# Patient Record
Sex: FEMALE | Race: BLACK OR AFRICAN AMERICAN | URBAN - METROPOLITAN AREA
[De-identification: names, ages, dates, MRNs, and addresses within clinical notes are randomized per-mention and may not be internally consistent; named-entity substitution may affect disease eponyms.]

---

## 2021-06-02 ENCOUNTER — LAB OUTSIDE AN ENCOUNTER (OUTPATIENT)
Dept: URBAN - METROPOLITAN AREA CLINIC 105 | Facility: CLINIC | Age: 66
End: 2021-06-02

## 2021-06-02 ENCOUNTER — OFFICE VISIT (OUTPATIENT)
Dept: URBAN - METROPOLITAN AREA CLINIC 105 | Facility: CLINIC | Age: 66
End: 2021-06-02
Payer: MEDICARE

## 2021-06-02 DIAGNOSIS — R19.7 DIARRHEA, UNSPECIFIED TYPE: ICD-10-CM

## 2021-06-02 DIAGNOSIS — D12.6 COLON ADENOMA: ICD-10-CM

## 2021-06-02 DIAGNOSIS — K57.90 DIVERTICULOSIS: ICD-10-CM

## 2021-06-02 DIAGNOSIS — F11.90 CHRONIC NARCOTIC USE: ICD-10-CM

## 2021-06-02 DIAGNOSIS — K21.00 GASTROESOPHAGEAL REFLUX DISEASE WITH ESOPHAGITIS WITHOUT HEMORRHAGE: ICD-10-CM

## 2021-06-02 DIAGNOSIS — K59.09 CHRONIC CONSTIPATION WITH OVERFLOW: ICD-10-CM

## 2021-06-02 DIAGNOSIS — I10 ESSENTIAL HYPERTENSION: ICD-10-CM

## 2021-06-02 DIAGNOSIS — E13.9 DIABETES 1.5, MANAGED AS TYPE 2: ICD-10-CM

## 2021-06-02 PROCEDURE — 99213 OFFICE O/P EST LOW 20 MIN: CPT | Performed by: INTERNAL MEDICINE

## 2021-06-02 RX ORDER — INSULIN DETEMIR 100 [IU]/ML
INJECT BY SUBCUTANEOUS ROUTE PER PRESCRIBER'S INSTRUCTIONS. INSULIN DOSING REQUIRES INDIVIDUALIZATION INJECTION, SOLUTION SUBCUTANEOUS
Qty: 1 | Refills: 0 | Status: ACTIVE | COMMUNITY
Start: 1900-01-01

## 2021-06-02 RX ORDER — GLIPIZIDE 10 MG/1
1 TABLET 30 MINUTES BEFORE BREAKFAST TABLET ORAL ONCE A DAY
Status: ACTIVE | COMMUNITY

## 2021-06-02 RX ORDER — PRAVASTATIN SODIUM 40 MG/1
TAKE 1 TABLET (40 MG) BY ORAL ROUTE ONCE DAILY AT BEDTIME TABLET ORAL 1
Qty: 0 | Refills: 0 | Status: DISCONTINUED | COMMUNITY
Start: 1900-01-01

## 2021-06-02 RX ORDER — ATORVASTATIN CALCIUM 80 MG/1
1 TABLET TABLET, FILM COATED ORAL ONCE A DAY
Status: ACTIVE | COMMUNITY

## 2021-06-02 RX ORDER — LOSARTAN POTASSIUM 100 MG/1
TAKE 1 TABLET (100 MG) BY ORAL ROUTE ONCE DAILY TABLET, FILM COATED ORAL 1
Qty: 0 | Refills: 0 | Status: ACTIVE | COMMUNITY
Start: 1900-01-01

## 2021-06-02 RX ORDER — CHLORHEXIDINE GLUCONATE 4 %
TAKE 2 TABLETS BY ORAL ROUTE DAILY LIQUID (ML) TOPICAL 1
Qty: 0 | Refills: 0 | Status: DISCONTINUED | COMMUNITY
Start: 1900-01-01

## 2021-06-02 RX ORDER — FAMOTIDINE 40 MG/1
1 TABLET AT BEDTIME TABLET, FILM COATED ORAL ONCE A DAY
Status: ACTIVE | COMMUNITY

## 2021-06-02 RX ORDER — CYCLOBENZAPRINE HYDROCHLORIDE 10 MG/1
1 TABLET AT BEDTIME AS NEEDED TABLET, FILM COATED ORAL ONCE A DAY
Status: ACTIVE | COMMUNITY

## 2021-06-02 RX ORDER — BIFIDOBACTERIUM LONGUM 10MM CELL
TAKE 1 CAPSULE BY ORAL ROUTE DAILY CAPSULE ORAL 1
Qty: 0 | Refills: 0 | Status: ACTIVE | COMMUNITY
Start: 1900-01-01

## 2021-06-02 RX ORDER — HYDROCODONE BITARTRATE AND ACETAMINOPHEN 10; 325 MG/1; MG/1
TAKE 1 TABLET BY ORAL ROUTE EVERY 6 HOURS AS NEEDED FOR PAIN TABLET ORAL
Qty: 0 | Refills: 0 | Status: ACTIVE | COMMUNITY
Start: 1900-01-01

## 2021-06-02 RX ORDER — OMEPRAZOLE 40 MG/1
1 CAPSULE 30 MINUTES BEFORE MORNING MEAL CAPSULE, DELAYED RELEASE ORAL ONCE A DAY
Qty: 90 | Refills: 3 | OUTPATIENT
Start: 2021-06-02

## 2021-06-02 RX ORDER — POTASSIUM CHLORIDE 1.5 G/1.58G
1 PACKET WITH FOOD POWDER, FOR SOLUTION ORAL ONCE A DAY
Status: ACTIVE | COMMUNITY

## 2021-06-02 RX ORDER — DULOXETINE 30 MG/1
CAPSULE, DELAYED RELEASE PELLETS ORAL
Qty: 0 | Refills: 0 | Status: DISCONTINUED | COMMUNITY
Start: 1900-01-01

## 2021-06-02 NOTE — HPI-TODAY'S VISIT:
65 yo lady here for positive FOBT. Pt of Dr Gilmore. "My stomach stays upset so much". She c/o a pinching epigastric sensation which she has had in the past and has recurred in the last week. Pain is intermittent, worse with food, about a few hours after. It did not happen today and she had eaten earlier. Some nausea, no vomiting. She has BMs mostly in the am, sometimes during the day. Stools have become loose again - she usually responds to a course of xifaxan. She has recently taken abx for Lyme disease 2 weeks ago. Does not recall the name. she does not use nsaids and has not taken ASA.  10/4/19 Discussed eGD and bx results. SHe is sleeping with HOB elevated but not adhering to 3 hours between eating and laying down. Stools are the same "sometimes diarrhea. sometimes constipatioN'. SHe is constipated due to iron she says. She has 1-2 BMs a day. by "constipation" she means hard stool, and by "diarrhea" she means loose stool. discussed elevated fecal calprotectin. No abdominal pain, "just growling and gas". 2/5/20 Ran out of PPI and is having a lot of heartburn and sour taste. Ran out in December. BMs are unchanged.' Eats dinner at 8 pm, lays down at 9 pm.  3/27/20 This was a telephone conversation. Pt was at home. Lives at home with daughter and grandson "I have been having real bad diarrhea". Says diarrhea started about 6 weeks ago. Has 4-5 BMs a day. NO blood in stool. No fevers or chills. No sick contacts. No vomiting. no abdominal pain. Feels very gassy.  6/2/21 c/o alternating constipation and loose stools. Off PPI as her script ran out and so heartburn recurred. Stopped metamucil as well.

## 2021-07-07 ENCOUNTER — OFFICE VISIT (OUTPATIENT)
Dept: URBAN - METROPOLITAN AREA CLINIC 105 | Facility: CLINIC | Age: 66
End: 2021-07-07
Payer: MEDICARE

## 2021-07-07 DIAGNOSIS — K59.09 CHRONIC CONSTIPATION WITH OVERFLOW: ICD-10-CM

## 2021-07-07 DIAGNOSIS — K21.00 GASTROESOPHAGEAL REFLUX DISEASE WITH ESOPHAGITIS WITHOUT HEMORRHAGE: ICD-10-CM

## 2021-07-07 DIAGNOSIS — R19.7 DIARRHEA, UNSPECIFIED TYPE: ICD-10-CM

## 2021-07-07 DIAGNOSIS — R11.0 NAUSEA: ICD-10-CM

## 2021-07-07 DIAGNOSIS — D12.6 COLON ADENOMA: ICD-10-CM

## 2021-07-07 DIAGNOSIS — N20.0 RENAL STONE: ICD-10-CM

## 2021-07-07 DIAGNOSIS — E13.9 DIABETES 1.5, MANAGED AS TYPE 2: ICD-10-CM

## 2021-07-07 DIAGNOSIS — K57.90 DIVERTICULOSIS: ICD-10-CM

## 2021-07-07 DIAGNOSIS — I10 ESSENTIAL HYPERTENSION: ICD-10-CM

## 2021-07-07 DIAGNOSIS — F11.90 CHRONIC NARCOTIC USE: ICD-10-CM

## 2021-07-07 PROCEDURE — 99214 OFFICE O/P EST MOD 30 MIN: CPT | Performed by: INTERNAL MEDICINE

## 2021-07-07 RX ORDER — ONDANSETRON HYDROCHLORIDE 8 MG/1
1 CAPSULE TABLET, FILM COATED ORAL
Qty: 90 | Refills: 0 | OUTPATIENT
Start: 2021-07-07 | End: 2021-08-06

## 2021-07-07 RX ORDER — OMEPRAZOLE 40 MG/1
1 CAPSULE 30 MINUTES BEFORE MORNING MEAL CAPSULE, DELAYED RELEASE ORAL TWICE A DAY
Qty: 180 | Refills: 3 | OUTPATIENT

## 2021-07-07 RX ORDER — FAMOTIDINE 40 MG/1
1 TABLET AT BEDTIME TABLET, FILM COATED ORAL ONCE A DAY
Status: ACTIVE | COMMUNITY

## 2021-07-07 RX ORDER — ATORVASTATIN CALCIUM 80 MG/1
1 TABLET TABLET, FILM COATED ORAL ONCE A DAY
Status: ACTIVE | COMMUNITY

## 2021-07-07 RX ORDER — GLIPIZIDE 10 MG/1
1 TABLET 30 MINUTES BEFORE BREAKFAST TABLET ORAL ONCE A DAY
Status: ACTIVE | COMMUNITY

## 2021-07-07 RX ORDER — LOSARTAN POTASSIUM 100 MG/1
TAKE 1 TABLET (100 MG) BY ORAL ROUTE ONCE DAILY TABLET, FILM COATED ORAL 1
Qty: 0 | Refills: 0 | Status: ACTIVE | COMMUNITY
Start: 1900-01-01

## 2021-07-07 RX ORDER — INSULIN DETEMIR 100 [IU]/ML
INJECT BY SUBCUTANEOUS ROUTE PER PRESCRIBER'S INSTRUCTIONS. INSULIN DOSING REQUIRES INDIVIDUALIZATION INJECTION, SOLUTION SUBCUTANEOUS
Qty: 1 | Refills: 0 | Status: ACTIVE | COMMUNITY
Start: 1900-01-01

## 2021-07-07 RX ORDER — OMEPRAZOLE 40 MG/1
1 CAPSULE 30 MINUTES BEFORE MORNING MEAL CAPSULE, DELAYED RELEASE ORAL ONCE A DAY
Qty: 90 | Refills: 3 | Status: ACTIVE | COMMUNITY
Start: 2021-06-02

## 2021-07-07 RX ORDER — POTASSIUM CHLORIDE 1.5 G/1.58G
1 PACKET WITH FOOD POWDER, FOR SOLUTION ORAL ONCE A DAY
Status: ACTIVE | COMMUNITY

## 2021-07-07 RX ORDER — CYCLOBENZAPRINE HYDROCHLORIDE 10 MG/1
1 TABLET AT BEDTIME AS NEEDED TABLET, FILM COATED ORAL ONCE A DAY
Status: ACTIVE | COMMUNITY

## 2021-07-07 RX ORDER — HYDROCODONE BITARTRATE AND ACETAMINOPHEN 10; 325 MG/1; MG/1
TAKE 1 TABLET BY ORAL ROUTE EVERY 6 HOURS AS NEEDED FOR PAIN TABLET ORAL
Qty: 0 | Refills: 0 | Status: ACTIVE | COMMUNITY
Start: 1900-01-01

## 2021-07-07 RX ORDER — BIFIDOBACTERIUM LONGUM 10MM CELL
TAKE 1 CAPSULE BY ORAL ROUTE DAILY CAPSULE ORAL 1
Qty: 0 | Refills: 0 | Status: ACTIVE | COMMUNITY
Start: 1900-01-01

## 2021-07-07 NOTE — HPI-TODAY'S VISIT:
63 yo lady here for positive FOBT. Pt of Dr Gilmore. "My stomach stays upset so much". She c/o a pinching epigastric sensation which she has had in the past and has recurred in the last week. Pain is intermittent, worse with food, about a few hours after. It did not happen today and she had eaten earlier. Some nausea, no vomiting. She has BMs mostly in the am, sometimes during the day. Stools have become loose again - she usually responds to a course of xifaxan. She has recently taken abx for Lyme disease 2 weeks ago. Does not recall the name. she does not use nsaids and has not taken ASA.  10/4/19 Discussed eGD and bx results. SHe is sleeping with HOB elevated but not adhering to 3 hours between eating and laying down. Stools are the same "sometimes diarrhea. sometimes constipatioN'. SHe is constipated due to iron she says. She has 1-2 BMs a day. by "constipation" she means hard stool, and by "diarrhea" she means loose stool. discussed elevated fecal calprotectin. No abdominal pain, "just growling and gas". 2/5/20 Ran out of PPI and is having a lot of heartburn and sour taste. Ran out in December. BMs are unchanged.' Eats dinner at 8 pm, lays down at 9 pm.  3/27/20 This was a telephone conversation. Pt was at home. Lives at home with daughter and grandson "I have been having real bad diarrhea". Says diarrhea started about 6 weeks ago. Has 4-5 BMs a day. NO blood in stool. No fevers or chills. No sick contacts. No vomiting. no abdominal pain. Feels very gassy.  6/2/21 c/o alternating constipation and loose stools. Off PPI as her script ran out and so heartburn recurred. Stopped metamucil as well.   7/7/21 c/o "my stomach stays upset". By this she means nausea. She feels like she wants to have a  BM but then does not evacuate. She endorses HB. She takes Omeprazole 40 mg daily in the am.  It helped with HB but not nauseea.  Nausea started about a month ago. Better with food. She does not vomit. Not smoking marijuana " I need some morphine" she then laughs. She states she has tried some of daughter's morphine for her back pain because her PCP wont give her some.  BMs alternating constipation and loose stools. Taking miralax once a day. Has not taken citrucel.  Says she still wakes up at night with sometimes coughing. Denies late night eating "my diabetes is bad"

## 2021-09-15 ENCOUNTER — TELEPHONE ENCOUNTER (OUTPATIENT)
Dept: URBAN - METROPOLITAN AREA CLINIC 105 | Facility: CLINIC | Age: 66
End: 2021-09-15

## 2021-09-15 ENCOUNTER — OFFICE VISIT (OUTPATIENT)
Dept: URBAN - METROPOLITAN AREA CLINIC 105 | Facility: CLINIC | Age: 66
End: 2021-09-15

## 2021-09-22 ENCOUNTER — OFFICE VISIT (OUTPATIENT)
Dept: URBAN - METROPOLITAN AREA CLINIC 105 | Facility: CLINIC | Age: 66
End: 2021-09-22
Payer: MEDICARE

## 2021-09-22 ENCOUNTER — LAB OUTSIDE AN ENCOUNTER (OUTPATIENT)
Dept: URBAN - METROPOLITAN AREA CLINIC 105 | Facility: CLINIC | Age: 66
End: 2021-09-22

## 2021-09-22 VITALS
BODY MASS INDEX: 31.82 KG/M2 | TEMPERATURE: 97.2 F | SYSTOLIC BLOOD PRESSURE: 157 MMHG | WEIGHT: 198 LBS | HEIGHT: 66 IN | HEART RATE: 79 BPM | DIASTOLIC BLOOD PRESSURE: 84 MMHG

## 2021-09-22 DIAGNOSIS — R19.7 DIARRHEA, UNSPECIFIED TYPE: ICD-10-CM

## 2021-09-22 DIAGNOSIS — R11.0 NAUSEA: ICD-10-CM

## 2021-09-22 DIAGNOSIS — N20.0 RENAL STONE: ICD-10-CM

## 2021-09-22 DIAGNOSIS — K57.90 DIVERTICULOSIS: ICD-10-CM

## 2021-09-22 DIAGNOSIS — F11.90 CHRONIC NARCOTIC USE: ICD-10-CM

## 2021-09-22 DIAGNOSIS — K21.00 GASTROESOPHAGEAL REFLUX DISEASE WITH ESOPHAGITIS WITHOUT HEMORRHAGE: ICD-10-CM

## 2021-09-22 DIAGNOSIS — I10 ESSENTIAL HYPERTENSION: ICD-10-CM

## 2021-09-22 DIAGNOSIS — K59.09 CHRONIC CONSTIPATION WITH OVERFLOW: ICD-10-CM

## 2021-09-22 DIAGNOSIS — E13.9 DIABETES 1.5, MANAGED AS TYPE 2: ICD-10-CM

## 2021-09-22 DIAGNOSIS — D12.6 COLON ADENOMA: ICD-10-CM

## 2021-09-22 PROCEDURE — 99214 OFFICE O/P EST MOD 30 MIN: CPT | Performed by: INTERNAL MEDICINE

## 2021-09-22 RX ORDER — CYCLOBENZAPRINE HYDROCHLORIDE 10 MG/1
1 TABLET AT BEDTIME AS NEEDED TABLET, FILM COATED ORAL ONCE A DAY
Status: ACTIVE | COMMUNITY

## 2021-09-22 RX ORDER — ATORVASTATIN CALCIUM 80 MG/1
1 TABLET TABLET, FILM COATED ORAL ONCE A DAY
Status: ACTIVE | COMMUNITY

## 2021-09-22 RX ORDER — ONDANSETRON HYDROCHLORIDE 8 MG/1
1 CAPSULE TABLET, FILM COATED ORAL
Qty: 90 | Refills: 0 | OUTPATIENT
Start: 2021-09-22 | End: 2021-10-22

## 2021-09-22 RX ORDER — OMEPRAZOLE 40 MG/1
1 CAPSULE 30 MINUTES BEFORE MORNING MEAL CAPSULE, DELAYED RELEASE ORAL TWICE A DAY
Qty: 180 | Refills: 3 | OUTPATIENT

## 2021-09-22 RX ORDER — LOSARTAN POTASSIUM 100 MG/1
TAKE 1 TABLET (100 MG) BY ORAL ROUTE ONCE DAILY TABLET, FILM COATED ORAL 1
Qty: 0 | Refills: 0 | Status: ACTIVE | COMMUNITY
Start: 1900-01-01

## 2021-09-22 RX ORDER — HYDROCODONE BITARTRATE AND ACETAMINOPHEN 10; 325 MG/1; MG/1
TAKE 1 TABLET BY ORAL ROUTE EVERY 6 HOURS AS NEEDED FOR PAIN TABLET ORAL
Qty: 0 | Refills: 0 | Status: ACTIVE | COMMUNITY
Start: 1900-01-01

## 2021-09-22 RX ORDER — OMEPRAZOLE 40 MG/1
1 CAPSULE 30 MINUTES BEFORE MORNING MEAL CAPSULE, DELAYED RELEASE ORAL TWICE A DAY
Qty: 180 | Refills: 3 | Status: ACTIVE | COMMUNITY

## 2021-09-22 RX ORDER — GLIPIZIDE 10 MG/1
1 TABLET 30 MINUTES BEFORE BREAKFAST TABLET ORAL ONCE A DAY
Status: ACTIVE | COMMUNITY

## 2021-09-22 RX ORDER — INSULIN DETEMIR 100 [IU]/ML
INJECT BY SUBCUTANEOUS ROUTE PER PRESCRIBER'S INSTRUCTIONS. INSULIN DOSING REQUIRES INDIVIDUALIZATION INJECTION, SOLUTION SUBCUTANEOUS
Qty: 1 | Refills: 0 | Status: ACTIVE | COMMUNITY
Start: 1900-01-01

## 2021-09-22 RX ORDER — FAMOTIDINE 40 MG/1
1 TABLET AT BEDTIME TABLET, FILM COATED ORAL ONCE A DAY
Status: ACTIVE | COMMUNITY

## 2021-09-22 RX ORDER — BIFIDOBACTERIUM LONGUM 10MM CELL
TAKE 1 CAPSULE BY ORAL ROUTE DAILY CAPSULE ORAL 1
Qty: 0 | Refills: 0 | Status: ACTIVE | COMMUNITY
Start: 1900-01-01

## 2021-09-22 RX ORDER — POTASSIUM CHLORIDE 1.5 G/1.58G
1 PACKET WITH FOOD POWDER, FOR SOLUTION ORAL ONCE A DAY
Status: ACTIVE | COMMUNITY

## 2021-09-22 NOTE — HPI-TODAY'S VISIT:
63 yo lady here for positive FOBT. Pt of Dr Gilmore. "My stomach stays upset so much". She c/o a pinching epigastric sensation which she has had in the past and has recurred in the last week. Pain is intermittent, worse with food, about a few hours after. It did not happen today and she had eaten earlier. Some nausea, no vomiting. She has BMs mostly in the am, sometimes during the day. Stools have become loose again - she usually responds to a course of xifaxan. She has recently taken abx for Lyme disease 2 weeks ago. Does not recall the name. she does not use nsaids and has not taken ASA.  10/4/19 Discussed eGD and bx results. SHe is sleeping with HOB elevated but not adhering to 3 hours between eating and laying down. Stools are the same "sometimes diarrhea. sometimes constipatioN'. SHe is constipated due to iron she says. She has 1-2 BMs a day. by "constipation" she means hard stool, and by "diarrhea" she means loose stool. discussed elevated fecal calprotectin. No abdominal pain, "just growling and gas". 2/5/20 Ran out of PPI and is having a lot of heartburn and sour taste. Ran out in December. BMs are unchanged.' Eats dinner at 8 pm, lays down at 9 pm.  3/27/20 This was a telephone conversation. Pt was at home. Lives at home with daughter and grandson "I have been having real bad diarrhea". Says diarrhea started about 6 weeks ago. Has 4-5 BMs a day. NO blood in stool. No fevers or chills. No sick contacts. No vomiting. no abdominal pain. Feels very gassy.  6/2/21 c/o alternating constipation and loose stools. Off PPI as her script ran out and so heartburn recurred. Stopped metamucil as well.   7/7/21 c/o "my stomach stays upset". By this she means nausea. She feels like she wants to have a  BM but then does not evacuate. She endorses HB. She takes Omeprazole 40 mg daily in the am.  It helped with HB but not nauseea.  Nausea started about a month ago. Better with food. She does not vomit. Not smoking marijuana " I need some morphine" she then laughs. She states she has tried some of daughter's morphine for her back pain because her PCP wont give her some.  BMs alternating constipation and loose stools. Taking miralax once a day. Has not taken citrucel.  Says she still wakes up at night with sometimes coughing. Denies late night eating "my diabetes is bad" .  9/22/21 Still c/o nausea "my stomach is upset" Is taking omeprazole bid Did not  zofran. says it was not at the pharmacy.  PPI bid did not help. No vomiting.  Nausea is usually in the am. Eats dinner late up until 9.30 pm. Has DM. Hbaic 10.  SAys despite taking metamucil and miralax "BMs are slacking off", Has about 3 BMs a week "depending on what I eat".

## 2021-12-01 ENCOUNTER — OFFICE VISIT (OUTPATIENT)
Dept: URBAN - METROPOLITAN AREA CLINIC 105 | Facility: CLINIC | Age: 66
End: 2021-12-01
Payer: MEDICARE

## 2021-12-01 ENCOUNTER — LAB OUTSIDE AN ENCOUNTER (OUTPATIENT)
Dept: URBAN - METROPOLITAN AREA CLINIC 105 | Facility: CLINIC | Age: 66
End: 2021-12-01

## 2021-12-01 VITALS
SYSTOLIC BLOOD PRESSURE: 145 MMHG | DIASTOLIC BLOOD PRESSURE: 82 MMHG | WEIGHT: 201.4 LBS | HEART RATE: 77 BPM | BODY MASS INDEX: 32.37 KG/M2 | TEMPERATURE: 96.8 F | HEIGHT: 66 IN

## 2021-12-01 DIAGNOSIS — E13.9 DIABETES 1.5, MANAGED AS TYPE 2: ICD-10-CM

## 2021-12-01 DIAGNOSIS — K57.90 DIVERTICULOSIS: ICD-10-CM

## 2021-12-01 DIAGNOSIS — D12.6 COLON ADENOMA: ICD-10-CM

## 2021-12-01 DIAGNOSIS — F11.90 CHRONIC NARCOTIC USE: ICD-10-CM

## 2021-12-01 DIAGNOSIS — K21.00 GASTROESOPHAGEAL REFLUX DISEASE WITH ESOPHAGITIS WITHOUT HEMORRHAGE: ICD-10-CM

## 2021-12-01 DIAGNOSIS — K59.09 CHRONIC CONSTIPATION WITH OVERFLOW: ICD-10-CM

## 2021-12-01 DIAGNOSIS — R19.7 DIARRHEA, UNSPECIFIED TYPE: ICD-10-CM

## 2021-12-01 DIAGNOSIS — I10 ESSENTIAL HYPERTENSION: ICD-10-CM

## 2021-12-01 DIAGNOSIS — N20.0 RENAL STONE: ICD-10-CM

## 2021-12-01 DIAGNOSIS — R11.0 NAUSEA: ICD-10-CM

## 2021-12-01 PROBLEM — 426875007: Status: ACTIVE | Noted: 2021-06-02

## 2021-12-01 PROBLEM — 95570007: Status: ACTIVE | Noted: 2021-07-07

## 2021-12-01 PROBLEM — 59621000: Status: ACTIVE | Noted: 2021-06-02

## 2021-12-01 PROCEDURE — 99213 OFFICE O/P EST LOW 20 MIN: CPT | Performed by: INTERNAL MEDICINE

## 2021-12-01 RX ORDER — HYDROCODONE BITARTRATE AND ACETAMINOPHEN 10; 325 MG/1; MG/1
TAKE 1 TABLET BY ORAL ROUTE EVERY 6 HOURS AS NEEDED FOR PAIN TABLET ORAL
Qty: 0 | Refills: 0 | Status: ACTIVE | COMMUNITY
Start: 1900-01-01

## 2021-12-01 RX ORDER — INSULIN DETEMIR 100 [IU]/ML
INJECT BY SUBCUTANEOUS ROUTE PER PRESCRIBER'S INSTRUCTIONS. INSULIN DOSING REQUIRES INDIVIDUALIZATION INJECTION, SOLUTION SUBCUTANEOUS
Qty: 1 | Refills: 0 | Status: ACTIVE | COMMUNITY
Start: 1900-01-01

## 2021-12-01 RX ORDER — BIFIDOBACTERIUM LONGUM 10MM CELL
TAKE 1 CAPSULE BY ORAL ROUTE DAILY CAPSULE ORAL 1
Qty: 0 | Refills: 0 | Status: ACTIVE | COMMUNITY
Start: 1900-01-01

## 2021-12-01 RX ORDER — FAMOTIDINE 40 MG/1
1 TABLET AT BEDTIME TABLET, FILM COATED ORAL ONCE A DAY
Status: ACTIVE | COMMUNITY

## 2021-12-01 RX ORDER — ATORVASTATIN CALCIUM 80 MG/1
1 TABLET TABLET, FILM COATED ORAL ONCE A DAY
Status: ACTIVE | COMMUNITY

## 2021-12-01 RX ORDER — POTASSIUM CHLORIDE 1.5 G/1.58G
1 PACKET WITH FOOD POWDER, FOR SOLUTION ORAL ONCE A DAY
Status: ACTIVE | COMMUNITY

## 2021-12-01 RX ORDER — LOSARTAN POTASSIUM 100 MG/1
TAKE 1 TABLET (100 MG) BY ORAL ROUTE ONCE DAILY TABLET, FILM COATED ORAL 1
Qty: 0 | Refills: 0 | Status: ACTIVE | COMMUNITY
Start: 1900-01-01

## 2021-12-01 RX ORDER — CYCLOBENZAPRINE HYDROCHLORIDE 10 MG/1
1 TABLET AT BEDTIME AS NEEDED TABLET, FILM COATED ORAL ONCE A DAY
Status: ACTIVE | COMMUNITY

## 2021-12-01 RX ORDER — GLIPIZIDE 10 MG/1
1 TABLET 30 MINUTES BEFORE BREAKFAST TABLET ORAL ONCE A DAY
Status: ACTIVE | COMMUNITY

## 2021-12-01 RX ORDER — OMEPRAZOLE 40 MG/1
1 CAPSULE 30 MINUTES BEFORE MORNING MEAL CAPSULE, DELAYED RELEASE ORAL TWICE A DAY
Qty: 180 | Refills: 3 | OUTPATIENT

## 2021-12-01 RX ORDER — OMEPRAZOLE 40 MG/1
1 CAPSULE 30 MINUTES BEFORE MORNING MEAL CAPSULE, DELAYED RELEASE ORAL TWICE A DAY
Qty: 180 | Refills: 3 | Status: ACTIVE | COMMUNITY

## 2021-12-01 NOTE — HPI-TODAY'S VISIT:
63 yo lady here for positive FOBT. Pt of Dr Gilmore. "My stomach stays upset so much". She c/o a pinching epigastric sensation which she has had in the past and has recurred in the last week. Pain is intermittent, worse with food, about a few hours after. It did not happen today and she had eaten earlier. Some nausea, no vomiting. She has BMs mostly in the am, sometimes during the day. Stools have become loose again - she usually responds to a course of xifaxan. She has recently taken abx for Lyme disease 2 weeks ago. Does not recall the name. she does not use nsaids and has not taken ASA.  10/4/19 Discussed eGD and bx results. SHe is sleeping with HOB elevated but not adhering to 3 hours between eating and laying down. Stools are the same "sometimes diarrhea. sometimes constipatioN'. SHe is constipated due to iron she says. She has 1-2 BMs a day. by "constipation" she means hard stool, and by "diarrhea" she means loose stool. discussed elevated fecal calprotectin. No abdominal pain, "just growling and gas". 2/5/20 Ran out of PPI and is having a lot of heartburn and sour taste. Ran out in December. BMs are unchanged.' Eats dinner at 8 pm, lays down at 9 pm.  3/27/20 This was a telephone conversation. Pt was at home. Lives at home with daughter and grandson "I have been having real bad diarrhea". Says diarrhea started about 6 weeks ago. Has 4-5 BMs a day. NO blood in stool. No fevers or chills. No sick contacts. No vomiting. no abdominal pain. Feels very gassy.  6/2/21 c/o alternating constipation and loose stools. Off PPI as her script ran out and so heartburn recurred. Stopped metamucil as well.   7/7/21 c/o "my stomach stays upset". By this she means nausea. She feels like she wants to have a  BM but then does not evacuate. She endorses HB. She takes Omeprazole 40 mg daily in the am.  It helped with HB but not nauseea.  Nausea started about a month ago. Better with food. She does not vomit. Not smoking marijuana " I need some morphine" she then laughs. She states she has tried some of daughter's morphine for her back pain because her PCP wont give her some.  BMs alternating constipation and loose stools. Taking miralax once a day. Has not taken citrucel.  Says she still wakes up at night with sometimes coughing. Denies late night eating "my diabetes is bad" .  9/22/21 Still c/o nausea "my stomach is upset" Is taking omeprazole bid Did not  zofran. says it was not at the pharmacy.  PPI bid did not help. No vomiting.  Nausea is usually in the am. Eats dinner late up until 9.30 pm. Has DM. Hbaic 10.  SAys despite taking metamucil and miralax "BMs are slacking off", Has about 3 BMs a week "depending on what I eat".   12/1/21 Pt says her stomach feels good "its not bothering me ". Taking omeprazole 40 mg bid.  Discussed barium swallow. KUB.  BMs are regular. "I had to stop the miralax it was making me go too much". She is still taking metamucil and having a BM 2 ice a day.  She says she is still constipated "because it 's like rocks"

## 2022-02-02 ENCOUNTER — OFFICE VISIT (OUTPATIENT)
Dept: URBAN - METROPOLITAN AREA CLINIC 105 | Facility: CLINIC | Age: 67
End: 2022-02-02

## 2023-04-21 ENCOUNTER — LAB OUTSIDE AN ENCOUNTER (OUTPATIENT)
Dept: URBAN - METROPOLITAN AREA CLINIC 105 | Facility: CLINIC | Age: 68
End: 2023-04-21

## 2023-04-21 ENCOUNTER — WEB ENCOUNTER (OUTPATIENT)
Dept: URBAN - METROPOLITAN AREA CLINIC 105 | Facility: CLINIC | Age: 68
End: 2023-04-21

## 2023-04-21 ENCOUNTER — OFFICE VISIT (OUTPATIENT)
Dept: URBAN - METROPOLITAN AREA CLINIC 105 | Facility: CLINIC | Age: 68
End: 2023-04-21
Payer: MEDICARE

## 2023-04-21 VITALS
DIASTOLIC BLOOD PRESSURE: 83 MMHG | HEART RATE: 73 BPM | SYSTOLIC BLOOD PRESSURE: 150 MMHG | BODY MASS INDEX: 31.79 KG/M2 | WEIGHT: 197.8 LBS | TEMPERATURE: 97.4 F | HEIGHT: 66 IN

## 2023-04-21 DIAGNOSIS — K21.00 GASTROESOPHAGEAL REFLUX DISEASE WITH ESOPHAGITIS WITHOUT HEMORRHAGE: ICD-10-CM

## 2023-04-21 DIAGNOSIS — K57.90 DIVERTICULOSIS: ICD-10-CM

## 2023-04-21 DIAGNOSIS — K59.09 CHRONIC CONSTIPATION WITH OVERFLOW: ICD-10-CM

## 2023-04-21 DIAGNOSIS — R19.7 DIARRHEA, UNSPECIFIED TYPE: ICD-10-CM

## 2023-04-21 DIAGNOSIS — D12.6 COLON ADENOMA: ICD-10-CM

## 2023-04-21 DIAGNOSIS — E13.9 DIABETES 1.5, MANAGED AS TYPE 2: ICD-10-CM

## 2023-04-21 DIAGNOSIS — F11.90 CHRONIC NARCOTIC USE: ICD-10-CM

## 2023-04-21 DIAGNOSIS — N20.0 RENAL STONE: ICD-10-CM

## 2023-04-21 DIAGNOSIS — I10 ESSENTIAL HYPERTENSION: ICD-10-CM

## 2023-04-21 DIAGNOSIS — R11.0 NAUSEA: ICD-10-CM

## 2023-04-21 PROCEDURE — 99213 OFFICE O/P EST LOW 20 MIN: CPT | Performed by: INTERNAL MEDICINE

## 2023-04-21 RX ORDER — FAMOTIDINE 40 MG/1
1 TABLET AT BEDTIME TABLET, FILM COATED ORAL ONCE A DAY
Status: ON HOLD | COMMUNITY

## 2023-04-21 RX ORDER — OMEPRAZOLE 40 MG/1
1 CAPSULE 30 MINUTES BEFORE MORNING MEAL CAPSULE, DELAYED RELEASE ORAL ONCE A DAY
Qty: 90 | Refills: 0 | OUTPATIENT

## 2023-04-21 RX ORDER — CYCLOBENZAPRINE HYDROCHLORIDE 10 MG/1
1 TABLET AT BEDTIME AS NEEDED TABLET, FILM COATED ORAL ONCE A DAY
Status: ON HOLD | COMMUNITY

## 2023-04-21 RX ORDER — POTASSIUM CHLORIDE 1.5 G/1.58G
1 PACKET WITH FOOD POWDER, FOR SOLUTION ORAL ONCE A DAY
COMMUNITY

## 2023-04-21 RX ORDER — OMEPRAZOLE 40 MG/1
1 CAPSULE 30 MINUTES BEFORE MORNING MEAL CAPSULE, DELAYED RELEASE ORAL TWICE A DAY
Qty: 180 | Refills: 3 | Status: ON HOLD | COMMUNITY

## 2023-04-21 RX ORDER — LOSARTAN POTASSIUM 100 MG/1
TAKE 1 TABLET (100 MG) BY ORAL ROUTE ONCE DAILY TABLET, FILM COATED ORAL 1
Qty: 0 | Refills: 0 | COMMUNITY
Start: 1900-01-01

## 2023-04-21 RX ORDER — INSULIN DETEMIR 100 [IU]/ML
INJECT BY SUBCUTANEOUS ROUTE PER PRESCRIBER'S INSTRUCTIONS. INSULIN DOSING REQUIRES INDIVIDUALIZATION INJECTION, SOLUTION SUBCUTANEOUS
Qty: 1 | Refills: 0 | COMMUNITY
Start: 1900-01-01

## 2023-04-21 RX ORDER — ATORVASTATIN CALCIUM 80 MG/1
1 TABLET TABLET, FILM COATED ORAL ONCE A DAY
Status: ON HOLD | COMMUNITY

## 2023-04-21 RX ORDER — GLIPIZIDE 10 MG/1
1 TABLET 30 MINUTES BEFORE BREAKFAST TABLET ORAL ONCE A DAY
COMMUNITY

## 2023-04-21 RX ORDER — HYDROCODONE BITARTRATE AND ACETAMINOPHEN 10; 325 MG/1; MG/1
TAKE 1 TABLET BY ORAL ROUTE EVERY 6 HOURS AS NEEDED FOR PAIN TABLET ORAL
Qty: 0 | Refills: 0 | COMMUNITY
Start: 1900-01-01

## 2023-04-21 RX ORDER — BIFIDOBACTERIUM LONGUM 10MM CELL
TAKE 1 CAPSULE BY ORAL ROUTE DAILY CAPSULE ORAL 1
Qty: 0 | Refills: 0 | COMMUNITY
Start: 1900-01-01

## 2023-04-21 NOTE — HPI-TODAY'S VISIT:
65 yo lady here for positive FOBT. Pt of Dr Gilmore. "My stomach stays upset so much". She c/o a pinching epigastric sensation which she has had in the past and has recurred in the last week. Pain is intermittent, worse with food, about a few hours after. It did not happen today and she had eaten earlier. Some nausea, no vomiting. She has BMs mostly in the am, sometimes during the day. Stools have become loose again - she usually responds to a course of xifaxan. She has recently taken abx for Lyme disease 2 weeks ago. Does not recall the name. she does not use nsaids and has not taken ASA.  10/4/19 Discussed eGD and bx results. SHe is sleeping with HOB elevated but not adhering to 3 hours between eating and laying down. Stools are the same "sometimes diarrhea. sometimes constipatioN'. SHe is constipated due to iron she says. She has 1-2 BMs a day. by "constipation" she means hard stool, and by "diarrhea" she means loose stool. discussed elevated fecal calprotectin. No abdominal pain, "just growling and gas". 2/5/20 Ran out of PPI and is having a lot of heartburn and sour taste. Ran out in December. BMs are unchanged.' Eats dinner at 8 pm, lays down at 9 pm.  3/27/20 This was a telephone conversation. Pt was at home. Lives at home with daughter and grandson "I have been having real bad diarrhea". Says diarrhea started about 6 weeks ago. Has 4-5 BMs a day. NO blood in stool. No fevers or chills. No sick contacts. No vomiting. no abdominal pain. Feels very gassy.  6/2/21 c/o alternating constipation and loose stools. Off PPI as her script ran out and so heartburn recurred. Stopped metamucil as well.   7/7/21 c/o "my stomach stays upset". By this she means nausea. She feels like she wants to have a  BM but then does not evacuate. She endorses HB. She takes Omeprazole 40 mg daily in the am.  It helped with HB but not nauseea.  Nausea started about a month ago. Better with food. She does not vomit. Not smoking marijuana " I need some morphine" she then laughs. She states she has tried some of daughter's morphine for her back pain because her PCP wont give her some.  BMs alternating constipation and loose stools. Taking miralax once a day. Has not taken citrucel.  Says she still wakes up at night with sometimes coughing. Denies late night eating "my diabetes is bad" .  9/22/21 Still c/o nausea "my stomach is upset" Is taking omeprazole bid Did not  zofran. says it was not at the pharmacy.  PPI bid did not help. No vomiting.  Nausea is usually in the am. Eats dinner late up until 9.30 pm. Has DM. Hbaic 10.  SAys despite taking metamucil and miralax "BMs are slacking off", Has about 3 BMs a week "depending on what I eat".   12/1/21 Pt says her stomach feels good "its not bothering me ". Taking omeprazole 40 mg bid.  Discussed barium swallow. KUB.  BMs are regular. "I had to stop the miralax it was making me go too much". She is still taking metamucil and having a BM 2 ice a day.  She says she is still constipated "because it 's like rocks"  4/21/23 Here for colon surveillance.  Having regular BMs. No blood in stool.  Occasional loose stools. has stopped miralax. Has BMs every day - 3 sometimes. Sometimes hard, sometimes runny

## 2023-04-24 ENCOUNTER — TELEPHONE ENCOUNTER (OUTPATIENT)
Dept: URBAN - METROPOLITAN AREA CLINIC 105 | Facility: CLINIC | Age: 68
End: 2023-04-24

## 2023-04-24 RX ORDER — ONDANSETRON HYDROCHLORIDE 4 MG/1
1 TABLET TABLET, FILM COATED ORAL ONCE A DAY
Qty: 30 | OUTPATIENT
Start: 2023-04-24

## 2023-04-28 ENCOUNTER — TELEPHONE ENCOUNTER (OUTPATIENT)
Dept: URBAN - METROPOLITAN AREA CLINIC 105 | Facility: CLINIC | Age: 68
End: 2023-04-28

## 2023-04-28 RX ORDER — RIFAXIMIN 550 MG/1
1 TABLET TABLET ORAL THREE TIMES A DAY
Qty: 42 TABLET | Refills: 1 | OUTPATIENT
Start: 2023-04-28 | End: 2023-05-26

## 2023-06-27 ENCOUNTER — OFFICE VISIT (OUTPATIENT)
Dept: URBAN - METROPOLITAN AREA SURGERY CENTER 16 | Facility: SURGERY CENTER | Age: 68
End: 2023-06-27
Payer: MEDICARE

## 2023-06-27 DIAGNOSIS — Z86.010 ADENOMAS PERSONAL HISTORY OF COLONIC POLYPS: ICD-10-CM

## 2023-06-27 PROCEDURE — G0105 COLORECTAL SCRN; HI RISK IND: HCPCS | Performed by: INTERNAL MEDICINE

## 2023-06-27 RX ORDER — ONDANSETRON HYDROCHLORIDE 4 MG/1
1 TABLET TABLET, FILM COATED ORAL ONCE A DAY
Qty: 30 | Status: ACTIVE | COMMUNITY
Start: 2023-04-24

## 2023-06-27 RX ORDER — POTASSIUM CHLORIDE 1.5 G/1.58G
1 PACKET WITH FOOD POWDER, FOR SOLUTION ORAL ONCE A DAY
COMMUNITY

## 2023-06-27 RX ORDER — OMEPRAZOLE 40 MG/1
1 CAPSULE 30 MINUTES BEFORE MORNING MEAL CAPSULE, DELAYED RELEASE ORAL ONCE A DAY
Qty: 90 | Refills: 0 | Status: ACTIVE | COMMUNITY

## 2023-06-27 RX ORDER — FAMOTIDINE 40 MG/1
1 TABLET AT BEDTIME TABLET, FILM COATED ORAL ONCE A DAY
Status: ON HOLD | COMMUNITY

## 2023-06-27 RX ORDER — GLIPIZIDE 10 MG/1
1 TABLET 30 MINUTES BEFORE BREAKFAST TABLET ORAL ONCE A DAY
COMMUNITY

## 2023-06-27 RX ORDER — ATORVASTATIN CALCIUM 80 MG/1
1 TABLET TABLET, FILM COATED ORAL ONCE A DAY
Status: ON HOLD | COMMUNITY

## 2023-06-27 RX ORDER — CYCLOBENZAPRINE HYDROCHLORIDE 10 MG/1
1 TABLET AT BEDTIME AS NEEDED TABLET, FILM COATED ORAL ONCE A DAY
Status: ON HOLD | COMMUNITY

## 2023-06-27 RX ORDER — HYDROCODONE BITARTRATE AND ACETAMINOPHEN 10; 325 MG/1; MG/1
TAKE 1 TABLET BY ORAL ROUTE EVERY 6 HOURS AS NEEDED FOR PAIN TABLET ORAL
Qty: 0 | Refills: 0 | COMMUNITY
Start: 1900-01-01

## 2023-06-27 RX ORDER — INSULIN DETEMIR 100 [IU]/ML
INJECT BY SUBCUTANEOUS ROUTE PER PRESCRIBER'S INSTRUCTIONS. INSULIN DOSING REQUIRES INDIVIDUALIZATION INJECTION, SOLUTION SUBCUTANEOUS
Qty: 1 | Refills: 0 | COMMUNITY
Start: 1900-01-01

## 2023-06-27 RX ORDER — LOSARTAN POTASSIUM 100 MG/1
TAKE 1 TABLET (100 MG) BY ORAL ROUTE ONCE DAILY TABLET, FILM COATED ORAL 1
Qty: 0 | Refills: 0 | COMMUNITY
Start: 1900-01-01

## 2023-06-27 RX ORDER — BIFIDOBACTERIUM LONGUM 10MM CELL
TAKE 1 CAPSULE BY ORAL ROUTE DAILY CAPSULE ORAL 1
Qty: 0 | Refills: 0 | COMMUNITY
Start: 1900-01-01

## 2023-07-21 ENCOUNTER — OFFICE VISIT (OUTPATIENT)
Dept: URBAN - METROPOLITAN AREA CLINIC 105 | Facility: CLINIC | Age: 68
End: 2023-07-21
Payer: MEDICARE

## 2023-07-21 VITALS
HEART RATE: 76 BPM | DIASTOLIC BLOOD PRESSURE: 77 MMHG | WEIGHT: 196 LBS | SYSTOLIC BLOOD PRESSURE: 159 MMHG | HEIGHT: 66 IN | TEMPERATURE: 97.9 F | BODY MASS INDEX: 31.5 KG/M2

## 2023-07-21 DIAGNOSIS — K57.90 DIVERTICULOSIS: ICD-10-CM

## 2023-07-21 DIAGNOSIS — K21.00 GASTROESOPHAGEAL REFLUX DISEASE WITH ESOPHAGITIS WITHOUT HEMORRHAGE: ICD-10-CM

## 2023-07-21 DIAGNOSIS — F11.90 CHRONIC NARCOTIC USE: ICD-10-CM

## 2023-07-21 DIAGNOSIS — R19.7 DIARRHEA, UNSPECIFIED TYPE: ICD-10-CM

## 2023-07-21 DIAGNOSIS — D12.6 COLON ADENOMA: ICD-10-CM

## 2023-07-21 DIAGNOSIS — E13.9 DIABETES 1.5, MANAGED AS TYPE 2: ICD-10-CM

## 2023-07-21 DIAGNOSIS — I10 ESSENTIAL HYPERTENSION: ICD-10-CM

## 2023-07-21 DIAGNOSIS — N20.0 RENAL STONE: ICD-10-CM

## 2023-07-21 DIAGNOSIS — R11.0 NAUSEA: ICD-10-CM

## 2023-07-21 DIAGNOSIS — K59.09 CHRONIC CONSTIPATION WITH OVERFLOW: ICD-10-CM

## 2023-07-21 PROCEDURE — 99213 OFFICE O/P EST LOW 20 MIN: CPT | Performed by: INTERNAL MEDICINE

## 2023-07-21 RX ORDER — GLIPIZIDE 10 MG/1
1 TABLET 30 MINUTES BEFORE BREAKFAST TABLET ORAL ONCE A DAY
Status: ACTIVE | COMMUNITY

## 2023-07-21 RX ORDER — BIFIDOBACTERIUM LONGUM 10MM CELL
TAKE 1 CAPSULE BY ORAL ROUTE DAILY CAPSULE ORAL 1
Qty: 0 | Refills: 0 | Status: ACTIVE | COMMUNITY
Start: 1900-01-01

## 2023-07-21 RX ORDER — FAMOTIDINE 40 MG/1
1 TABLET AT BEDTIME TABLET, FILM COATED ORAL ONCE A DAY
Status: ON HOLD | COMMUNITY

## 2023-07-21 RX ORDER — OMEPRAZOLE 40 MG/1
1 CAPSULE 30 MINUTES BEFORE MORNING MEAL CAPSULE, DELAYED RELEASE ORAL ONCE A DAY
Qty: 90 | Refills: 0 | OUTPATIENT

## 2023-07-21 RX ORDER — CYCLOBENZAPRINE HYDROCHLORIDE 10 MG/1
1 TABLET AT BEDTIME AS NEEDED TABLET, FILM COATED ORAL ONCE A DAY
Status: ACTIVE | COMMUNITY

## 2023-07-21 RX ORDER — ATORVASTATIN CALCIUM 80 MG/1
1 TABLET TABLET, FILM COATED ORAL ONCE A DAY
Status: ON HOLD | COMMUNITY

## 2023-07-21 RX ORDER — POTASSIUM CHLORIDE 1.5 G/1.58G
1 PACKET WITH FOOD POWDER, FOR SOLUTION ORAL ONCE A DAY
Status: ACTIVE | COMMUNITY

## 2023-07-21 RX ORDER — OMEPRAZOLE 40 MG/1
1 CAPSULE 30 MINUTES BEFORE MORNING MEAL CAPSULE, DELAYED RELEASE ORAL ONCE A DAY
Qty: 90 | Refills: 0 | Status: ACTIVE | COMMUNITY

## 2023-07-21 RX ORDER — INSULIN DETEMIR 100 [IU]/ML
INJECT BY SUBCUTANEOUS ROUTE PER PRESCRIBER'S INSTRUCTIONS. INSULIN DOSING REQUIRES INDIVIDUALIZATION INJECTION, SOLUTION SUBCUTANEOUS
Qty: 1 | Refills: 0 | Status: ACTIVE | COMMUNITY
Start: 1900-01-01

## 2023-07-21 RX ORDER — HYDROCODONE BITARTRATE AND ACETAMINOPHEN 10; 325 MG/1; MG/1
TAKE 1 TABLET BY ORAL ROUTE EVERY 6 HOURS AS NEEDED FOR PAIN TABLET ORAL
Qty: 0 | Refills: 0 | Status: ACTIVE | COMMUNITY
Start: 1900-01-01

## 2023-07-21 RX ORDER — ONDANSETRON HYDROCHLORIDE 4 MG/1
1 TABLET TABLET, FILM COATED ORAL ONCE A DAY
Qty: 30 | Status: ACTIVE | COMMUNITY
Start: 2023-04-24

## 2023-07-21 RX ORDER — LOSARTAN POTASSIUM 100 MG/1
TAKE 1 TABLET (100 MG) BY ORAL ROUTE ONCE DAILY TABLET, FILM COATED ORAL 1
Qty: 0 | Refills: 0 | Status: ACTIVE | COMMUNITY
Start: 1900-01-01

## 2023-07-21 NOTE — HPI-TODAY'S VISIT:
63 yo lady here for positive FOBT. Pt of Dr Gilmore. "My stomach stays upset so much". She c/o a pinching epigastric sensation which she has had in the past and has recurred in the last week. Pain is intermittent, worse with food, about a few hours after. It did not happen today and she had eaten earlier. Some nausea, no vomiting. She has BMs mostly in the am, sometimes during the day. Stools have become loose again - she usually responds to a course of xifaxan. She has recently taken abx for Lyme disease 2 weeks ago. Does not recall the name. she does not use nsaids and has not taken ASA.  10/4/19 Discussed eGD and bx results. SHe is sleeping with HOB elevated but not adhering to 3 hours between eating and laying down. Stools are the same "sometimes diarrhea. sometimes constipatioN'. SHe is constipated due to iron she says. She has 1-2 BMs a day. by "constipation" she means hard stool, and by "diarrhea" she means loose stool. discussed elevated fecal calprotectin. No abdominal pain, "just growling and gas". 2/5/20 Ran out of PPI and is having a lot of heartburn and sour taste. Ran out in December. BMs are unchanged.' Eats dinner at 8 pm, lays down at 9 pm.  3/27/20 This was a telephone conversation. Pt was at home. Lives at home with daughter and grandson "I have been having real bad diarrhea". Says diarrhea started about 6 weeks ago. Has 4-5 BMs a day. NO blood in stool. No fevers or chills. No sick contacts. No vomiting. no abdominal pain. Feels very gassy.  6/2/21 c/o alternating constipation and loose stools. Off PPI as her script ran out and so heartburn recurred. Stopped metamucil as well.   7/7/21 c/o "my stomach stays upset". By this she means nausea. She feels like she wants to have a  BM but then does not evacuate. She endorses HB. She takes Omeprazole 40 mg daily in the am.  It helped with HB but not nauseea.  Nausea started about a month ago. Better with food. She does not vomit. Not smoking marijuana " I need some morphine" she then laughs. She states she has tried some of daughter's morphine for her back pain because her PCP wont give her some.  BMs alternating constipation and loose stools. Taking miralax once a day. Has not taken citrucel.  Says she still wakes up at night with sometimes coughing. Denies late night eating "my diabetes is bad" .  9/22/21 Still c/o nausea "my stomach is upset" Is taking omeprazole bid Did not  zofran. says it was not at the pharmacy.  PPI bid did not help. No vomiting.  Nausea is usually in the am. Eats dinner late up until 9.30 pm. Has DM. Hbaic 10.  SAys despite taking metamucil and miralax "BMs are slacking off", Has about 3 BMs a week "depending on what I eat".   12/1/21 Pt says her stomach feels good "its not bothering me ". Taking omeprazole 40 mg bid.  Discussed barium swallow. KUB.  BMs are regular. "I had to stop the miralax it was making me go too much". She is still taking metamucil and having a BM 2 ice a day.  She says she is still constipated "because it 's like rocks"  4/21/23 Here for colon surveillance.  Having regular BMs. No blood in stool.  Occasional loose stools. has stopped miralax. Has BMs every day - 3 sometimes. Sometimes hard, sometimes runny  7/21/23 Here for colon f/u At her procedure she had stated to anesthesia that she had CHF.  WE had called [facility] and her EF was wnl.  Having regular BMs on miralax.  Not taking fiber supplement. Takes PPI daily for reflux. told she can try qod with reflux precautions.

## 2024-01-19 ENCOUNTER — OFFICE VISIT (OUTPATIENT)
Dept: URBAN - METROPOLITAN AREA CLINIC 105 | Facility: CLINIC | Age: 69
End: 2024-01-19

## 2024-03-08 ENCOUNTER — OV EP (OUTPATIENT)
Dept: URBAN - METROPOLITAN AREA CLINIC 105 | Facility: CLINIC | Age: 69
End: 2024-03-08
Payer: MEDICARE

## 2024-03-08 VITALS
BODY MASS INDEX: 30.53 KG/M2 | HEART RATE: 77 BPM | TEMPERATURE: 97.5 F | WEIGHT: 190 LBS | HEIGHT: 66 IN | DIASTOLIC BLOOD PRESSURE: 80 MMHG | SYSTOLIC BLOOD PRESSURE: 150 MMHG

## 2024-03-08 DIAGNOSIS — K57.90 DIVERTICULOSIS: ICD-10-CM

## 2024-03-08 DIAGNOSIS — I10 ESSENTIAL HYPERTENSION: ICD-10-CM

## 2024-03-08 DIAGNOSIS — R19.7 DIARRHEA, UNSPECIFIED TYPE: ICD-10-CM

## 2024-03-08 DIAGNOSIS — K21.00 GASTROESOPHAGEAL REFLUX DISEASE WITH ESOPHAGITIS WITHOUT HEMORRHAGE: ICD-10-CM

## 2024-03-08 DIAGNOSIS — D12.6 COLON ADENOMA: ICD-10-CM

## 2024-03-08 DIAGNOSIS — K59.09 CHRONIC CONSTIPATION WITH OVERFLOW: ICD-10-CM

## 2024-03-08 DIAGNOSIS — R11.0 NAUSEA: ICD-10-CM

## 2024-03-08 DIAGNOSIS — F11.90 CHRONIC NARCOTIC USE: ICD-10-CM

## 2024-03-08 DIAGNOSIS — E13.9 DIABETES 1.5, MANAGED AS TYPE 2: ICD-10-CM

## 2024-03-08 DIAGNOSIS — N20.0 RENAL STONE: ICD-10-CM

## 2024-03-08 PROCEDURE — 99213 OFFICE O/P EST LOW 20 MIN: CPT | Performed by: INTERNAL MEDICINE

## 2024-03-08 RX ORDER — HYDROCODONE BITARTRATE AND ACETAMINOPHEN 10; 325 MG/1; MG/1
TAKE 1 TABLET BY ORAL ROUTE EVERY 6 HOURS AS NEEDED FOR PAIN TABLET ORAL
Qty: 0 | Refills: 0 | Status: ACTIVE | COMMUNITY
Start: 1900-01-01

## 2024-03-08 RX ORDER — CYCLOBENZAPRINE HYDROCHLORIDE 10 MG/1
1 TABLET AT BEDTIME AS NEEDED TABLET, FILM COATED ORAL ONCE A DAY
Status: ACTIVE | COMMUNITY

## 2024-03-08 RX ORDER — ATORVASTATIN CALCIUM 80 MG/1
1 TABLET TABLET, FILM COATED ORAL ONCE A DAY
Status: ON HOLD | COMMUNITY

## 2024-03-08 RX ORDER — FAMOTIDINE 40 MG/1
1 TABLET AT BEDTIME TABLET, FILM COATED ORAL ONCE A DAY
Status: ON HOLD | COMMUNITY

## 2024-03-08 RX ORDER — OMEPRAZOLE 40 MG/1
1 CAPSULE 30 MINUTES BEFORE MORNING MEAL CAPSULE, DELAYED RELEASE ORAL ONCE A DAY
Qty: 90 | Refills: 0

## 2024-03-08 RX ORDER — POTASSIUM CHLORIDE 1.5 G/1.58G
1 PACKET WITH FOOD POWDER, FOR SOLUTION ORAL ONCE A DAY
Status: ACTIVE | COMMUNITY

## 2024-03-08 RX ORDER — CHLORTHALIDONE 25 MG/1
TABLET ORAL
Qty: 0 | Refills: 0 | Status: ACTIVE | COMMUNITY
Start: 2014-12-10

## 2024-03-08 RX ORDER — ONDANSETRON HYDROCHLORIDE 4 MG/1
1 TABLET TABLET, FILM COATED ORAL ONCE A DAY
Qty: 30 | Status: ACTIVE | COMMUNITY
Start: 2023-04-24

## 2024-03-08 RX ORDER — LOSARTAN POTASSIUM 100 MG/1
TAKE 1 TABLET (100 MG) BY ORAL ROUTE ONCE DAILY TABLET, FILM COATED ORAL 1
Qty: 0 | Refills: 0 | Status: ACTIVE | COMMUNITY
Start: 1900-01-01

## 2024-03-08 RX ORDER — GLIPIZIDE 10 MG/1
1 TABLET 30 MINUTES BEFORE BREAKFAST TABLET ORAL ONCE A DAY
Status: ACTIVE | COMMUNITY

## 2024-03-08 RX ORDER — INSULIN DETEMIR 100 [IU]/ML
INJECT BY SUBCUTANEOUS ROUTE PER PRESCRIBER'S INSTRUCTIONS. INSULIN DOSING REQUIRES INDIVIDUALIZATION INJECTION, SOLUTION SUBCUTANEOUS
Qty: 1 | Refills: 0 | Status: ACTIVE | COMMUNITY
Start: 1900-01-01

## 2024-03-08 RX ORDER — OMEPRAZOLE 40 MG/1
1 CAPSULE 30 MINUTES BEFORE MORNING MEAL CAPSULE, DELAYED RELEASE ORAL ONCE A DAY
Qty: 90 | Refills: 0 | Status: ACTIVE | COMMUNITY

## 2024-03-08 RX ORDER — CARVEDILOL 12.5 MG/1
TABLET, FILM COATED ORAL
Qty: 0 | Refills: 0 | Status: ACTIVE | COMMUNITY
Start: 2014-12-10

## 2024-03-08 RX ORDER — BIFIDOBACTERIUM LONGUM 10MM CELL
TAKE 1 CAPSULE BY ORAL ROUTE DAILY CAPSULE ORAL 1
Qty: 0 | Refills: 0 | Status: ACTIVE | COMMUNITY
Start: 1900-01-01

## 2024-03-08 NOTE — HPI-TODAY'S VISIT:
63 yo lady here for positive FOBT. Pt of Dr Gilmore. "My stomach stays upset so much". She c/o a pinching epigastric sensation which she has had in the past and has recurred in the last week. Pain is intermittent, worse with food, about a few hours after. It did not happen today and she had eaten earlier. Some nausea, no vomiting. She has BMs mostly in the am, sometimes during the day. Stools have become loose again - she usually responds to a course of xifaxan. She has recently taken abx for Lyme disease 2 weeks ago. Does not recall the name. she does not use nsaids and has not taken ASA.  10/4/19 Discussed eGD and bx results. SHe is sleeping with HOB elevated but not adhering to 3 hours between eating and laying down. Stools are the same "sometimes diarrhea. sometimes constipatioN'. SHe is constipated due to iron she says. She has 1-2 BMs a day. by "constipation" she means hard stool, and by "diarrhea" she means loose stool. discussed elevated fecal calprotectin. No abdominal pain, "just growling and gas". 2/5/20 Ran out of PPI and is having a lot of heartburn and sour taste. Ran out in December. BMs are unchanged.' Eats dinner at 8 pm, lays down at 9 pm.  3/27/20 This was a telephone conversation. Pt was at home. Lives at home with daughter and grandson "I have been having real bad diarrhea". Says diarrhea started about 6 weeks ago. Has 4-5 BMs a day. NO blood in stool. No fevers or chills. No sick contacts. No vomiting. no abdominal pain. Feels very gassy.  6/2/21 c/o alternating constipation and loose stools. Off PPI as her script ran out and so heartburn recurred. Stopped metamucil as well.   7/7/21 c/o "my stomach stays upset". By this she means nausea. She feels like she wants to have a  BM but then does not evacuate. She endorses HB. She takes Omeprazole 40 mg daily in the am.  It helped with HB but not nauseea.  Nausea started about a month ago. Better with food. She does not vomit. Not smoking marijuana " I need some morphine" she then laughs. She states she has tried some of daughter's morphine for her back pain because her PCP wont give her some.  BMs alternating constipation and loose stools. Taking miralax once a day. Has not taken citrucel.  Says she still wakes up at night with sometimes coughing. Denies late night eating "my diabetes is bad" .  9/22/21 Still c/o nausea "my stomach is upset" Is taking omeprazole bid Did not  zofran. says it was not at the pharmacy.  PPI bid did not help. No vomiting.  Nausea is usually in the am. Eats dinner late up until 9.30 pm. Has DM. Hbaic 10.  SAys despite taking metamucil and miralax "BMs are slacking off", Has about 3 BMs a week "depending on what I eat".   12/1/21 Pt says her stomach feels good "its not bothering me ". Taking omeprazole 40 mg bid.  Discussed barium swallow. KUB.  BMs are regular. "I had to stop the miralax it was making me go too much". She is still taking metamucil and having a BM 2 ice a day.  She says she is still constipated "because it 's like rocks"  4/21/23 Here for colon surveillance.  Having regular BMs. No blood in stool.  Occasional loose stools. has stopped miralax. Has BMs every day - 3 sometimes. Sometimes hard, sometimes runny  7/21/23 Here for colon f/u At her procedure she had stated to anesthesia that she had CHF.  WE had called [facility] and her EF was wnl.  Having regular BMs on miralax.  Not taking fiber supplement. Takes PPI daily for reflux. told she can try qod with reflux precautions.  3/8/24 No acute issues Here for routine f.u No concerns she says.

## 2024-09-13 ENCOUNTER — OFFICE VISIT (OUTPATIENT)
Dept: URBAN - METROPOLITAN AREA CLINIC 105 | Facility: CLINIC | Age: 69
End: 2024-09-13

## 2024-10-18 ENCOUNTER — LAB OUTSIDE AN ENCOUNTER (OUTPATIENT)
Dept: URBAN - METROPOLITAN AREA CLINIC 105 | Facility: CLINIC | Age: 69
End: 2024-10-18

## 2024-10-18 ENCOUNTER — DASHBOARD ENCOUNTERS (OUTPATIENT)
Age: 69
End: 2024-10-18

## 2024-10-18 ENCOUNTER — OFFICE VISIT (OUTPATIENT)
Dept: URBAN - METROPOLITAN AREA CLINIC 105 | Facility: CLINIC | Age: 69
End: 2024-10-18
Payer: MEDICARE

## 2024-10-18 VITALS
WEIGHT: 185 LBS | DIASTOLIC BLOOD PRESSURE: 84 MMHG | HEIGHT: 66 IN | TEMPERATURE: 97.2 F | BODY MASS INDEX: 29.73 KG/M2 | HEART RATE: 78 BPM | SYSTOLIC BLOOD PRESSURE: 173 MMHG

## 2024-10-18 DIAGNOSIS — R10.10 UPPER ABDOMINAL PAIN: ICD-10-CM

## 2024-10-18 DIAGNOSIS — N20.0 RENAL STONE: ICD-10-CM

## 2024-10-18 DIAGNOSIS — F11.90 CHRONIC NARCOTIC USE: ICD-10-CM

## 2024-10-18 DIAGNOSIS — I10 ESSENTIAL HYPERTENSION: ICD-10-CM

## 2024-10-18 DIAGNOSIS — K59.09 CHRONIC CONSTIPATION WITH OVERFLOW: ICD-10-CM

## 2024-10-18 DIAGNOSIS — R11.0 NAUSEA: ICD-10-CM

## 2024-10-18 DIAGNOSIS — R19.7 DIARRHEA, UNSPECIFIED TYPE: ICD-10-CM

## 2024-10-18 DIAGNOSIS — D12.6 COLON ADENOMA: ICD-10-CM

## 2024-10-18 DIAGNOSIS — E13.9 DIABETES 1.5, MANAGED AS TYPE 2: ICD-10-CM

## 2024-10-18 DIAGNOSIS — K57.30 ACQUIRED DIVERTICULOSIS OF COLON: ICD-10-CM

## 2024-10-18 DIAGNOSIS — K21.00 GASTROESOPHAGEAL REFLUX DISEASE WITH ESOPHAGITIS WITHOUT HEMORRHAGE: ICD-10-CM

## 2024-10-18 PROCEDURE — 99214 OFFICE O/P EST MOD 30 MIN: CPT | Performed by: INTERNAL MEDICINE

## 2024-10-18 RX ORDER — BIFIDOBACTERIUM LONGUM 10MM CELL
TAKE 1 CAPSULE BY ORAL ROUTE DAILY CAPSULE ORAL 1
Qty: 0 | Refills: 0 | Status: ACTIVE | COMMUNITY
Start: 1900-01-01

## 2024-10-18 RX ORDER — LOSARTAN POTASSIUM 100 MG/1
TAKE 1 TABLET (100 MG) BY ORAL ROUTE ONCE DAILY TABLET, FILM COATED ORAL 1
Qty: 0 | Refills: 0 | Status: ACTIVE | COMMUNITY
Start: 1900-01-01

## 2024-10-18 RX ORDER — CARVEDILOL 12.5 MG/1
TABLET, FILM COATED ORAL
Qty: 0 | Refills: 0 | Status: ACTIVE | COMMUNITY
Start: 2014-12-10

## 2024-10-18 RX ORDER — CHLORTHALIDONE 25 MG/1
TABLET ORAL
Qty: 0 | Refills: 0 | Status: ACTIVE | COMMUNITY
Start: 2014-12-10

## 2024-10-18 RX ORDER — ONDANSETRON HYDROCHLORIDE 4 MG/1
1 TABLET TABLET, FILM COATED ORAL ONCE A DAY
Qty: 30 | Status: ACTIVE | COMMUNITY
Start: 2023-04-24

## 2024-10-18 RX ORDER — CYCLOBENZAPRINE HYDROCHLORIDE 10 MG/1
1 TABLET AT BEDTIME AS NEEDED TABLET, FILM COATED ORAL ONCE A DAY
Status: ACTIVE | COMMUNITY

## 2024-10-18 RX ORDER — OMEPRAZOLE 40 MG/1
1 CAPSULE 30 MINUTES BEFORE MORNING MEAL CAPSULE, DELAYED RELEASE ORAL ONCE A DAY
Qty: 90 | Refills: 0 | Status: ACTIVE | COMMUNITY

## 2024-10-18 RX ORDER — POTASSIUM CHLORIDE 1.5 G/1.58G
1 PACKET WITH FOOD POWDER, FOR SOLUTION ORAL ONCE A DAY
Status: ACTIVE | COMMUNITY

## 2024-10-18 RX ORDER — GLIPIZIDE 10 MG/1
1 TABLET 30 MINUTES BEFORE BREAKFAST TABLET ORAL ONCE A DAY
Status: ACTIVE | COMMUNITY

## 2024-10-18 RX ORDER — INSULIN DETEMIR 100 [IU]/ML
INJECT BY SUBCUTANEOUS ROUTE PER PRESCRIBER'S INSTRUCTIONS. INSULIN DOSING REQUIRES INDIVIDUALIZATION INJECTION, SOLUTION SUBCUTANEOUS
Qty: 1 | Refills: 0 | Status: ACTIVE | COMMUNITY
Start: 1900-01-01

## 2024-10-18 RX ORDER — PRAVASTATIN SODIUM 40 MG/1
TABLET ORAL
Qty: 90 TABLET | Status: ACTIVE | COMMUNITY

## 2024-10-18 RX ORDER — LUBIPROSTONE 24 UG/1
1 CAPSULE WITH FOOD AND WATER CAPSULE, GELATIN COATED ORAL TWICE A DAY
Qty: 180 CAPSULE | Refills: 3 | OUTPATIENT
Start: 2024-10-18 | End: 2025-10-13

## 2024-10-18 RX ORDER — HYDROCODONE BITARTRATE AND ACETAMINOPHEN 10; 325 MG/1; MG/1
TAKE 1 TABLET BY ORAL ROUTE EVERY 6 HOURS AS NEEDED FOR PAIN TABLET ORAL
Qty: 0 | Refills: 0 | Status: ACTIVE | COMMUNITY
Start: 1900-01-01

## 2024-10-18 NOTE — HPI-TODAY'S VISIT:
63 yo lady here for positive FOBT. Pt of Dr Gilmore. "My stomach stays upset so much". She c/o a pinching epigastric sensation which she has had in the past and has recurred in the last week. Pain is intermittent, worse with food, about a few hours after. It did not happen today and she had eaten earlier. Some nausea, no vomiting. She has BMs mostly in the am, sometimes during the day. Stools have become loose again - she usually responds to a course of xifaxan. She has recently taken abx for Lyme disease 2 weeks ago. Does not recall the name. she does not use nsaids and has not taken ASA.  10/4/19 Discussed eGD and bx results. SHe is sleeping with HOB elevated but not adhering to 3 hours between eating and laying down. Stools are the same "sometimes diarrhea. sometimes constipatioN'. SHe is constipated due to iron she says. She has 1-2 BMs a day. by "constipation" she means hard stool, and by "diarrhea" she means loose stool. discussed elevated fecal calprotectin. No abdominal pain, "just growling and gas". 2/5/20 Ran out of PPI and is having a lot of heartburn and sour taste. Ran out in December. BMs are unchanged.' Eats dinner at 8 pm, lays down at 9 pm.  3/27/20 This was a telephone conversation. Pt was at home. Lives at home with daughter and grandson "I have been having real bad diarrhea". Says diarrhea started about 6 weeks ago. Has 4-5 BMs a day. NO blood in stool. No fevers or chills. No sick contacts. No vomiting. no abdominal pain. Feels very gassy.  6/2/21 c/o alternating constipation and loose stools. Off PPI as her script ran out and so heartburn recurred. Stopped metamucil as well.   7/7/21 c/o "my stomach stays upset". By this she means nausea. She feels like she wants to have a  BM but then does not evacuate. She endorses HB. She takes Omeprazole 40 mg daily in the am.  It helped with HB but not nauseea.  Nausea started about a month ago. Better with food. She does not vomit. Not smoking marijuana " I need some morphine" she then laughs. She states she has tried some of daughter's morphine for her back pain because her PCP wont give her some.  BMs alternating constipation and loose stools. Taking miralax once a day. Has not taken citrucel.  Says she still wakes up at night with sometimes coughing. Denies late night eating "my diabetes is bad" .  9/22/21 Still c/o nausea "my stomach is upset" Is taking omeprazole bid Did not  zofran. says it was not at the pharmacy.  PPI bid did not help. No vomiting.  Nausea is usually in the am. Eats dinner late up until 9.30 pm. Has DM. Hbaic 10.  SAys despite taking metamucil and miralax "BMs are slacking off", Has about 3 BMs a week "depending on what I eat".   12/1/21 Pt says her stomach feels good "its not bothering me ". Taking omeprazole 40 mg bid.  Discussed barium swallow. KUB.  BMs are regular. "I had to stop the miralax it was making me go too much". She is still taking metamucil and having a BM 2 ice a day.  She says she is still constipated "because it 's like rocks"  4/21/23 Here for colon surveillance.  Having regular BMs. No blood in stool.  Occasional loose stools. has stopped miralax. Has BMs every day - 3 sometimes. Sometimes hard, sometimes runny  7/21/23 Here for colon f/u At her procedure she had stated to anesthesia that she had CHF.  WE had called [facility] and her EF was wnl.  Having regular BMs on miralax.  Not taking fiber supplement. Takes PPI daily for reflux. told she can try qod with reflux precautions.  3/8/24 No acute issues Here for routine f.u No concerns she says.  10/18/24 Here for upper abd pain Ongoing one month. mostly at night. lasts 5-10 mins, sharp. no rship to food Stopped Omeprazole 40 mg for a while - longer than 6 months. Started it again last month.  No nausea or vomiiting. Says occ difficulty swallowing  chicken , drinks water to make it go down. No dysphagia to liquids.  Denies nsaid use. no ASA either.  Alternating constipation and loose stools. Hard and loose stools. no blood in stool.

## 2024-11-12 ENCOUNTER — OFFICE VISIT (OUTPATIENT)
Dept: URBAN - METROPOLITAN AREA SURGERY CENTER 16 | Facility: SURGERY CENTER | Age: 69
End: 2024-11-12
Payer: MEDICARE

## 2024-11-12 ENCOUNTER — CLAIMS CREATED FROM THE CLAIM WINDOW (OUTPATIENT)
Dept: URBAN - METROPOLITAN AREA CLINIC 4 | Facility: CLINIC | Age: 69
End: 2024-11-12
Payer: MEDICARE

## 2024-11-12 DIAGNOSIS — R10.13 ABDOMINAL DISCOMFORT, EPIGASTRIC: ICD-10-CM

## 2024-11-12 DIAGNOSIS — K20.80 ESOPHAGITIS, LOS ANGELES GRADE B: ICD-10-CM

## 2024-11-12 DIAGNOSIS — K44.9 HIATAL HERNIA: ICD-10-CM

## 2024-11-12 DIAGNOSIS — K31.89 OTHER DISEASES OF STOMACH AND DUODENUM: ICD-10-CM

## 2024-11-12 PROCEDURE — 00731 ANES UPR GI NDSC PX NOS: CPT | Performed by: ANESTHESIOLOGY

## 2024-11-12 PROCEDURE — 43239 EGD BIOPSY SINGLE/MULTIPLE: CPT | Performed by: INTERNAL MEDICINE

## 2024-11-12 PROCEDURE — 00731 ANES UPR GI NDSC PX NOS: CPT | Performed by: NURSE ANESTHETIST, CERTIFIED REGISTERED

## 2024-11-12 PROCEDURE — 88305 TISSUE EXAM BY PATHOLOGIST: CPT | Performed by: PATHOLOGY

## 2024-11-19 ENCOUNTER — ERX REFILL RESPONSE (OUTPATIENT)
Dept: URBAN - METROPOLITAN AREA CLINIC 105 | Facility: CLINIC | Age: 69
End: 2024-11-19

## 2024-11-19 RX ORDER — OMEPRAZOLE 40 MG/1
TAKE 1 CAPSULE BY MOUTH EVERY DAY 30 MINUTES BEFORE MORNING MEAL FOR 90 DAYS CAPSULE, DELAYED RELEASE ORAL
Qty: 90 CAPSULE | Refills: 0 | OUTPATIENT

## 2024-11-19 RX ORDER — OMEPRAZOLE 40 MG/1
1 CAPSULE 30 MINUTES BEFORE MORNING MEAL CAPSULE, DELAYED RELEASE ORAL ONCE A DAY
Qty: 90 | Refills: 0 | OUTPATIENT

## 2025-01-28 ENCOUNTER — TELEPHONE ENCOUNTER (OUTPATIENT)
Dept: URBAN - METROPOLITAN AREA CLINIC 105 | Facility: CLINIC | Age: 70
End: 2025-01-28

## 2025-02-12 ENCOUNTER — OFFICE VISIT (OUTPATIENT)
Dept: URBAN - METROPOLITAN AREA CLINIC 105 | Facility: CLINIC | Age: 70
End: 2025-02-12
Payer: MEDICARE

## 2025-02-12 ENCOUNTER — LAB OUTSIDE AN ENCOUNTER (OUTPATIENT)
Dept: URBAN - METROPOLITAN AREA CLINIC 105 | Facility: CLINIC | Age: 70
End: 2025-02-12

## 2025-02-12 VITALS
WEIGHT: 186 LBS | HEART RATE: 88 BPM | BODY MASS INDEX: 29.89 KG/M2 | TEMPERATURE: 97.3 F | DIASTOLIC BLOOD PRESSURE: 69 MMHG | HEIGHT: 66 IN | SYSTOLIC BLOOD PRESSURE: 160 MMHG

## 2025-02-12 DIAGNOSIS — D12.6 COLON ADENOMA: ICD-10-CM

## 2025-02-12 DIAGNOSIS — N20.0 RENAL STONE: ICD-10-CM

## 2025-02-12 DIAGNOSIS — E13.9 DIABETES 1.5, MANAGED AS TYPE 2: ICD-10-CM

## 2025-02-12 DIAGNOSIS — K21.00 GASTROESOPHAGEAL REFLUX DISEASE WITH ESOPHAGITIS WITHOUT HEMORRHAGE: ICD-10-CM

## 2025-02-12 DIAGNOSIS — F11.90 CHRONIC NARCOTIC USE: ICD-10-CM

## 2025-02-12 DIAGNOSIS — K59.09 CHRONIC CONSTIPATION WITH OVERFLOW: ICD-10-CM

## 2025-02-12 DIAGNOSIS — R10.10 UPPER ABDOMINAL PAIN: ICD-10-CM

## 2025-02-12 DIAGNOSIS — R11.0 NAUSEA: ICD-10-CM

## 2025-02-12 DIAGNOSIS — K57.90 DIVERTICULOSIS: ICD-10-CM

## 2025-02-12 DIAGNOSIS — I10 ESSENTIAL HYPERTENSION: ICD-10-CM

## 2025-02-12 DIAGNOSIS — R19.7 DIARRHEA, UNSPECIFIED TYPE: ICD-10-CM

## 2025-02-12 PROCEDURE — 99214 OFFICE O/P EST MOD 30 MIN: CPT | Performed by: INTERNAL MEDICINE

## 2025-02-12 RX ORDER — GLIPIZIDE 10 MG/1
1 TABLET 30 MINUTES BEFORE BREAKFAST TABLET ORAL ONCE A DAY
Status: ACTIVE | COMMUNITY

## 2025-02-12 RX ORDER — CHLORTHALIDONE 25 MG/1
TABLET ORAL
Qty: 0 | Refills: 0 | Status: ACTIVE | COMMUNITY
Start: 2014-12-10

## 2025-02-12 RX ORDER — HYDROCODONE BITARTRATE AND ACETAMINOPHEN 10; 325 MG/1; MG/1
TAKE 1 TABLET BY ORAL ROUTE EVERY 6 HOURS AS NEEDED FOR PAIN TABLET ORAL
Qty: 0 | Refills: 0 | Status: ACTIVE | COMMUNITY
Start: 1900-01-01

## 2025-02-12 RX ORDER — CARVEDILOL 12.5 MG/1
TABLET, FILM COATED ORAL
Qty: 0 | Refills: 0 | Status: ACTIVE | COMMUNITY
Start: 2014-12-10

## 2025-02-12 RX ORDER — CYCLOBENZAPRINE HYDROCHLORIDE 10 MG/1
1 TABLET AT BEDTIME AS NEEDED TABLET, FILM COATED ORAL ONCE A DAY
Status: ACTIVE | COMMUNITY

## 2025-02-12 RX ORDER — LUBIPROSTONE 24 UG/1
1 CAPSULE WITH FOOD AND WATER CAPSULE, GELATIN COATED ORAL TWICE A DAY
Qty: 180 CAPSULE | Refills: 3 | Status: ACTIVE | COMMUNITY
Start: 2024-10-18 | End: 2025-10-13

## 2025-02-12 RX ORDER — BIFIDOBACTERIUM LONGUM 10MM CELL
TAKE 1 CAPSULE BY ORAL ROUTE DAILY CAPSULE ORAL 1
Qty: 0 | Refills: 0 | Status: ACTIVE | COMMUNITY
Start: 1900-01-01

## 2025-02-12 RX ORDER — POTASSIUM CHLORIDE 1.5 G/1.58G
1 PACKET WITH FOOD POWDER, FOR SOLUTION ORAL ONCE A DAY
Status: ACTIVE | COMMUNITY

## 2025-02-12 RX ORDER — PRAVASTATIN SODIUM 40 MG/1
TABLET ORAL
Qty: 90 TABLET | Status: ACTIVE | COMMUNITY

## 2025-02-12 RX ORDER — INSULIN DETEMIR 100 [IU]/ML
INJECT BY SUBCUTANEOUS ROUTE PER PRESCRIBER'S INSTRUCTIONS. INSULIN DOSING REQUIRES INDIVIDUALIZATION INJECTION, SOLUTION SUBCUTANEOUS
Qty: 1 | Refills: 0 | Status: ACTIVE | COMMUNITY
Start: 1900-01-01

## 2025-02-12 RX ORDER — ONDANSETRON HYDROCHLORIDE 4 MG/1
1 TABLET TABLET, FILM COATED ORAL ONCE A DAY
Qty: 30 | Status: ACTIVE | COMMUNITY
Start: 2023-04-24

## 2025-02-12 RX ORDER — LOSARTAN POTASSIUM 100 MG/1
TAKE 1 TABLET (100 MG) BY ORAL ROUTE ONCE DAILY TABLET, FILM COATED ORAL 1
Qty: 0 | Refills: 0 | Status: ACTIVE | COMMUNITY
Start: 1900-01-01

## 2025-02-12 RX ORDER — OMEPRAZOLE 40 MG/1
TAKE 1 CAPSULE BY MOUTH EVERY DAY 30 MINUTES BEFORE MORNING MEAL FOR 90 DAYS CAPSULE, DELAYED RELEASE ORAL
Qty: 90 CAPSULE | Refills: 0 | Status: ACTIVE | COMMUNITY

## 2025-02-12 NOTE — HPI-TODAY'S VISIT:
63 yo lady here for positive FOBT. Pt of Dr Gilmore. "My stomach stays upset so much". She c/o a pinching epigastric sensation which she has had in the past and has recurred in the last week. Pain is intermittent, worse with food, about a few hours after. It did not happen today and she had eaten earlier. Some nausea, no vomiting. She has BMs mostly in the am, sometimes during the day. Stools have become loose again - she usually responds to a course of xifaxan. She has recently taken abx for Lyme disease 2 weeks ago. Does not recall the name. she does not use nsaids and has not taken ASA.  10/4/19 Discussed eGD and bx results. SHe is sleeping with HOB elevated but not adhering to 3 hours between eating and laying down. Stools are the same "sometimes diarrhea. sometimes constipatioN'. SHe is constipated due to iron she says. She has 1-2 BMs a day. by "constipation" she means hard stool, and by "diarrhea" she means loose stool. discussed elevated fecal calprotectin. No abdominal pain, "just growling and gas". 2/5/20 Ran out of PPI and is having a lot of heartburn and sour taste. Ran out in December. BMs are unchanged.' Eats dinner at 8 pm, lays down at 9 pm.  3/27/20 This was a telephone conversation. Pt was at home. Lives at home with daughter and grandson "I have been having real bad diarrhea". Says diarrhea started about 6 weeks ago. Has 4-5 BMs a day. NO blood in stool. No fevers or chills. No sick contacts. No vomiting. no abdominal pain. Feels very gassy.  6/2/21 c/o alternating constipation and loose stools. Off PPI as her script ran out and so heartburn recurred. Stopped metamucil as well.   7/7/21 c/o "my stomach stays upset". By this she means nausea. She feels like she wants to have a  BM but then does not evacuate. She endorses HB. She takes Omeprazole 40 mg daily in the am.  It helped with HB but not nauseea.  Nausea started about a month ago. Better with food. She does not vomit. Not smoking marijuana " I need some morphine" she then laughs. She states she has tried some of daughter's morphine for her back pain because her PCP wont give her some.  BMs alternating constipation and loose stools. Taking miralax once a day. Has not taken citrucel.  Says she still wakes up at night with sometimes coughing. Denies late night eating "my diabetes is bad" .  9/22/21 Still c/o nausea "my stomach is upset" Is taking omeprazole bid Did not  zofran. says it was not at the pharmacy.  PPI bid did not help. No vomiting.  Nausea is usually in the am. Eats dinner late up until 9.30 pm. Has DM. Hbaic 10.  SAys despite taking metamucil and miralax "BMs are slacking off", Has about 3 BMs a week "depending on what I eat".   12/1/21 Pt says her stomach feels good "its not bothering me ". Taking omeprazole 40 mg bid.  Discussed barium swallow. KUB.  BMs are regular. "I had to stop the miralax it was making me go too much". She is still taking metamucil and having a BM 2 ice a day.  She says she is still constipated "because it 's like rocks"  4/21/23 Here for colon surveillance.  Having regular BMs. No blood in stool.  Occasional loose stools. has stopped miralax. Has BMs every day - 3 sometimes. Sometimes hard, sometimes runny  7/21/23 Here for colon f/u At her procedure she had stated to anesthesia that she had CHF.  WE had called [facility] and her EF was wnl.  Having regular BMs on miralax.  Not taking fiber supplement. Takes PPI daily for reflux. told she can try qod with reflux precautions.  3/8/24 No acute issues Here for routine f.u No concerns she says.  10/18/24 Here for upper abd pain Ongoing one month. mostly at night. lasts 5-10 mins, sharp. no rship to food Stopped Omeprazole 40 mg for a while - longer than 6 months. Started it again last month.  No nausea or vomiiting. Says occ difficulty swallowing  chicken , drinks water to make it go down. No dysphagia to liquids.  Denies nsaid use. no ASA either.  Alternating constipation and loose stools. Hard and loose stools. no blood in stool.  2/12/25 Here with daughter Upper abd pain is better with PPI which she restarted Says diarrhea returned in December - can last for days and then no BM for up to a week Says she is not on Amitiza. Last hbaic 7.1. She takes hydrocodone. Highest Hbaic 8.9

## 2025-02-23 ENCOUNTER — LAB OUTSIDE AN ENCOUNTER (OUTPATIENT)
Dept: URBAN - METROPOLITAN AREA CLINIC 105 | Facility: CLINIC | Age: 70
End: 2025-02-23

## 2025-02-26 LAB
ADENOVIRUS F 40/41: NOT DETECTED
CAMPYLOBACTER: NOT DETECTED
CLOSTRIDIUM DIFFICILE: NOT DETECTED
ENTAMOEBA HISTOLYTICA: NOT DETECTED
ENTEROAGGREGATIVE E.COLI: NOT DETECTED
ENTEROTOXIGENIC E.COLI: NOT DETECTED
ESCHERICHIA COLI O157: NOT DETECTED
GIARDIA LAMBLIA: NOT DETECTED
NOROVIRUS GI/GII: NOT DETECTED
ROTAVIRUS A: NOT DETECTED
SALMONELLA SPP.: NOT DETECTED
SHIGA-LIKE TOXIN PRODUCING E.COLI: NOT DETECTED
SHIGELLA SPP. / ENTEROINVASIVE E.COLI: NOT DETECTED
VIBRIO PARAHAEMOLYTICUS: NOT DETECTED
VIBRIO SPP.: NOT DETECTED
YERSINIA ENTEROCOLITICA: NOT DETECTED

## 2025-04-03 ENCOUNTER — OFFICE VISIT (OUTPATIENT)
Dept: URBAN - METROPOLITAN AREA CLINIC 17 | Facility: CLINIC | Age: 70
End: 2025-04-03
Payer: MEDICARE

## 2025-04-03 DIAGNOSIS — K59.09 CHRONIC CONSTIPATION WITH OVERFLOW: ICD-10-CM

## 2025-04-03 DIAGNOSIS — K21.00 GASTROESOPHAGEAL REFLUX DISEASE WITH ESOPHAGITIS WITHOUT HEMORRHAGE: ICD-10-CM

## 2025-04-03 DIAGNOSIS — R11.0 NAUSEA: ICD-10-CM

## 2025-04-03 DIAGNOSIS — R10.10 UPPER ABDOMINAL PAIN: ICD-10-CM

## 2025-04-03 DIAGNOSIS — F11.90 CHRONIC NARCOTIC USE: ICD-10-CM

## 2025-04-03 DIAGNOSIS — D12.6 COLON ADENOMA: ICD-10-CM

## 2025-04-03 DIAGNOSIS — N20.0 RENAL STONE: ICD-10-CM

## 2025-04-03 DIAGNOSIS — K57.90 DIVERTICULOSIS: ICD-10-CM

## 2025-04-03 DIAGNOSIS — E13.9 DIABETES 1.5, MANAGED AS TYPE 2: ICD-10-CM

## 2025-04-03 DIAGNOSIS — I10 ESSENTIAL HYPERTENSION: ICD-10-CM

## 2025-04-03 DIAGNOSIS — R19.7 DIARRHEA, UNSPECIFIED TYPE: ICD-10-CM

## 2025-04-03 PROCEDURE — 99213 OFFICE O/P EST LOW 20 MIN: CPT | Performed by: INTERNAL MEDICINE

## 2025-04-03 RX ORDER — CARVEDILOL 12.5 MG/1
TABLET, FILM COATED ORAL
Qty: 0 | Refills: 0 | Status: ACTIVE | COMMUNITY
Start: 2014-12-10

## 2025-04-03 RX ORDER — HYDROCODONE BITARTRATE AND ACETAMINOPHEN 10; 325 MG/1; MG/1
TAKE 1 TABLET BY ORAL ROUTE EVERY 6 HOURS AS NEEDED FOR PAIN TABLET ORAL
Qty: 0 | Refills: 0 | Status: ACTIVE | COMMUNITY
Start: 1900-01-01

## 2025-04-03 RX ORDER — POTASSIUM CHLORIDE 1.5 G/1.58G
1 PACKET WITH FOOD POWDER, FOR SOLUTION ORAL ONCE A DAY
Status: ACTIVE | COMMUNITY

## 2025-04-03 RX ORDER — PRAVASTATIN SODIUM 40 MG/1
TABLET ORAL
Qty: 90 TABLET | Status: ACTIVE | COMMUNITY

## 2025-04-03 RX ORDER — ONDANSETRON HYDROCHLORIDE 4 MG/1
1 TABLET TABLET, FILM COATED ORAL ONCE A DAY
Qty: 30 | Status: ACTIVE | COMMUNITY
Start: 2023-04-24

## 2025-04-03 RX ORDER — GLIPIZIDE 10 MG/1
1 TABLET 30 MINUTES BEFORE BREAKFAST TABLET ORAL ONCE A DAY
Status: ACTIVE | COMMUNITY

## 2025-04-03 RX ORDER — INSULIN DETEMIR 100 [IU]/ML
INJECT BY SUBCUTANEOUS ROUTE PER PRESCRIBER'S INSTRUCTIONS. INSULIN DOSING REQUIRES INDIVIDUALIZATION INJECTION, SOLUTION SUBCUTANEOUS
Qty: 1 | Refills: 0 | Status: ACTIVE | COMMUNITY
Start: 1900-01-01

## 2025-04-03 RX ORDER — LOSARTAN POTASSIUM 100 MG/1
TAKE 1 TABLET (100 MG) BY ORAL ROUTE ONCE DAILY TABLET, FILM COATED ORAL 1
Qty: 0 | Refills: 0 | Status: ACTIVE | COMMUNITY
Start: 1900-01-01

## 2025-04-03 RX ORDER — OMEPRAZOLE 40 MG/1
TAKE 1 CAPSULE BY MOUTH EVERY DAY 30 MINUTES BEFORE MORNING MEAL FOR 90 DAYS CAPSULE, DELAYED RELEASE ORAL
Qty: 90 CAPSULE | Refills: 0 | Status: ACTIVE | COMMUNITY

## 2025-04-03 RX ORDER — CHLORTHALIDONE 25 MG/1
TABLET ORAL
Qty: 0 | Refills: 0 | Status: ACTIVE | COMMUNITY
Start: 2014-12-10

## 2025-04-03 NOTE — HPI-TODAY'S VISIT:
63 yo lady here for positive FOBT. Pt of Dr Gilmore. "My stomach stays upset so much". She c/o a pinching epigastric sensation which she has had in the past and has recurred in the last week. Pain is intermittent, worse with food, about a few hours after. It did not happen today and she had eaten earlier. Some nausea, no vomiting. She has BMs mostly in the am, sometimes during the day. Stools have become loose again - she usually responds to a course of xifaxan. She has recently taken abx for Lyme disease 2 weeks ago. Does not recall the name. she does not use nsaids and has not taken ASA.  10/4/19 Discussed eGD and bx results. SHe is sleeping with HOB elevated but not adhering to 3 hours between eating and laying down. Stools are the same "sometimes diarrhea. sometimes constipatioN'. SHe is constipated due to iron she says. She has 1-2 BMs a day. by "constipation" she means hard stool, and by "diarrhea" she means loose stool. discussed elevated fecal calprotectin. No abdominal pain, "just growling and gas". 2/5/20 Ran out of PPI and is having a lot of heartburn and sour taste. Ran out in December. BMs are unchanged.' Eats dinner at 8 pm, lays down at 9 pm.  3/27/20 This was a telephone conversation. Pt was at home. Lives at home with daughter and grandson "I have been having real bad diarrhea". Says diarrhea started about 6 weeks ago. Has 4-5 BMs a day. NO blood in stool. No fevers or chills. No sick contacts. No vomiting. no abdominal pain. Feels very gassy.  6/2/21 c/o alternating constipation and loose stools. Off PPI as her script ran out and so heartburn recurred. Stopped metamucil as well.   7/7/21 c/o "my stomach stays upset". By this she means nausea. She feels like she wants to have a  BM but then does not evacuate. She endorses HB. She takes Omeprazole 40 mg daily in the am.  It helped with HB but not nauseea.  Nausea started about a month ago. Better with food. She does not vomit. Not smoking marijuana " I need some morphine" she then laughs. She states she has tried some of daughter's morphine for her back pain because her PCP wont give her some.  BMs alternating constipation and loose stools. Taking miralax once a day. Has not taken citrucel.  Says she still wakes up at night with sometimes coughing. Denies late night eating "my diabetes is bad" .  9/22/21 Still c/o nausea "my stomach is upset" Is taking omeprazole bid Did not  zofran. says it was not at the pharmacy.  PPI bid did not help. No vomiting.  Nausea is usually in the am. Eats dinner late up until 9.30 pm. Has DM. Hbaic 10.  SAys despite taking metamucil and miralax "BMs are slacking off", Has about 3 BMs a week "depending on what I eat".   12/1/21 Pt says her stomach feels good "its not bothering me ". Taking omeprazole 40 mg bid.  Discussed barium swallow. KUB.  BMs are regular. "I had to stop the miralax it was making me go too much". She is still taking metamucil and having a BM 2 ice a day.  She says she is still constipated "because it 's like rocks"  4/21/23 Here for colon surveillance.  Having regular BMs. No blood in stool.  Occasional loose stools. has stopped miralax. Has BMs every day - 3 sometimes. Sometimes hard, sometimes runny  7/21/23 Here for colon f/u At her procedure she had stated to anesthesia that she had CHF.  WE had called [facility] and her EF was wnl.  Having regular BMs on miralax.  Not taking fiber supplement. Takes PPI daily for reflux. told she can try qod with reflux precautions.  3/8/24 No acute issues Here for routine f.u No concerns she says.  10/18/24 Here for upper abd pain Ongoing one month. mostly at night. lasts 5-10 mins, sharp. no rship to food Stopped Omeprazole 40 mg for a while - longer than 6 months. Started it again last month.  No nausea or vomiiting. Says occ difficulty swallowing  chicken , drinks water to make it go down. No dysphagia to liquids.  Denies nsaid use. no ASA either.  Alternating constipation and loose stools. Hard and loose stools. no blood in stool.  2/12/25 Here with daughter Upper abd pain is better with PPI which she restarted Says diarrhea returned in December - can last for days and then no BM for up to a week Says she is not on Amitiza. Last hbaic 7.1. She takes hydrocodone. Highest Hbaic 8.9.  4/3/25 Here with daughter Daughter says she updated med list.  neg stool test. KUB neg for constipation.  NO more diarrhea. Infact had not had a BM for 2 days until today.

## 2025-05-07 ENCOUNTER — TELEPHONE ENCOUNTER (OUTPATIENT)
Dept: URBAN - METROPOLITAN AREA CLINIC 105 | Facility: CLINIC | Age: 70
End: 2025-05-07

## 2025-05-07 RX ORDER — OMEPRAZOLE 40 MG/1
TAKE 1 CAPSULE BY MOUTH EVERY DAY 30 MINUTES BEFORE MORNING MEAL FOR 90 DAYS CAPSULE, DELAYED RELEASE ORAL ONCE A DAY
Qty: 90 | Refills: 1

## 2025-05-19 ENCOUNTER — TELEPHONE ENCOUNTER (OUTPATIENT)
Dept: URBAN - METROPOLITAN AREA CLINIC 105 | Facility: CLINIC | Age: 70
End: 2025-05-19

## 2025-05-19 RX ORDER — RIFAXIMIN 550 MG/1
1 TABLET TABLET ORAL THREE TIMES A DAY
Qty: 42 TABLET | Refills: 0 | OUTPATIENT
Start: 2025-05-19 | End: 2025-06-02

## 2025-06-05 ENCOUNTER — TELEPHONE ENCOUNTER (OUTPATIENT)
Dept: URBAN - METROPOLITAN AREA CLINIC 17 | Facility: CLINIC | Age: 70
End: 2025-06-05

## 2025-06-05 RX ORDER — METRONIDAZOLE 250 MG/1
1 TABLET TABLET ORAL THREE TIMES A DAY
Qty: 30 | OUTPATIENT
Start: 2025-06-13 | End: 2025-06-23

## 2025-08-07 ENCOUNTER — LAB OUTSIDE AN ENCOUNTER (OUTPATIENT)
Dept: URBAN - METROPOLITAN AREA CLINIC 17 | Facility: CLINIC | Age: 70
End: 2025-08-07

## 2025-08-07 ENCOUNTER — OFFICE VISIT (OUTPATIENT)
Dept: URBAN - METROPOLITAN AREA CLINIC 17 | Facility: CLINIC | Age: 70
End: 2025-08-07
Payer: MEDICARE

## 2025-08-07 DIAGNOSIS — K57.90 DIVERTICULOSIS: ICD-10-CM

## 2025-08-07 DIAGNOSIS — K21.00 GASTROESOPHAGEAL REFLUX DISEASE WITH ESOPHAGITIS WITHOUT HEMORRHAGE: ICD-10-CM

## 2025-08-07 DIAGNOSIS — E13.9 DIABETES 1.5, MANAGED AS TYPE 2: ICD-10-CM

## 2025-08-07 DIAGNOSIS — D12.6 COLON ADENOMA: ICD-10-CM

## 2025-08-07 DIAGNOSIS — K59.09 CHRONIC CONSTIPATION WITH OVERFLOW: ICD-10-CM

## 2025-08-07 DIAGNOSIS — R19.7 DIARRHEA, UNSPECIFIED TYPE: ICD-10-CM

## 2025-08-07 DIAGNOSIS — R11.0 NAUSEA: ICD-10-CM

## 2025-08-07 DIAGNOSIS — R10.10 UPPER ABDOMINAL PAIN: ICD-10-CM

## 2025-08-07 DIAGNOSIS — I10 ESSENTIAL HYPERTENSION: ICD-10-CM

## 2025-08-07 DIAGNOSIS — N20.0 RENAL STONE: ICD-10-CM

## 2025-08-07 DIAGNOSIS — F11.90 CHRONIC NARCOTIC USE: ICD-10-CM

## 2025-08-07 PROCEDURE — 99214 OFFICE O/P EST MOD 30 MIN: CPT | Performed by: INTERNAL MEDICINE

## 2025-08-07 RX ORDER — LOSARTAN POTASSIUM 100 MG/1
TAKE 1 TABLET (100 MG) BY ORAL ROUTE ONCE DAILY TABLET, FILM COATED ORAL 1
Qty: 0 | Refills: 0 | Status: ACTIVE | COMMUNITY
Start: 1900-01-01

## 2025-08-07 RX ORDER — GLIPIZIDE 10 MG/1
1 TABLET 30 MINUTES BEFORE BREAKFAST TABLET ORAL ONCE A DAY
Status: ACTIVE | COMMUNITY

## 2025-08-07 RX ORDER — CARVEDILOL 12.5 MG/1
TABLET, FILM COATED ORAL
Qty: 0 | Refills: 0 | Status: ACTIVE | COMMUNITY
Start: 2014-12-10

## 2025-08-07 RX ORDER — OMEPRAZOLE 40 MG/1
TAKE 1 CAPSULE BY MOUTH EVERY DAY 30 MINUTES BEFORE MORNING MEAL FOR 90 DAYS CAPSULE, DELAYED RELEASE ORAL ONCE A DAY
Qty: 90 | Refills: 1 | Status: ACTIVE | COMMUNITY

## 2025-08-07 RX ORDER — ONDANSETRON HYDROCHLORIDE 4 MG/1
1 TABLET TABLET, FILM COATED ORAL ONCE A DAY
Qty: 30 | Status: ACTIVE | COMMUNITY
Start: 2023-04-24

## 2025-08-07 RX ORDER — POTASSIUM CHLORIDE 1.5 G/1.58G
1 PACKET WITH FOOD POWDER, FOR SOLUTION ORAL ONCE A DAY
Status: ACTIVE | COMMUNITY

## 2025-08-07 RX ORDER — CHLORTHALIDONE 25 MG/1
TABLET ORAL
Qty: 0 | Refills: 0 | Status: ACTIVE | COMMUNITY
Start: 2014-12-10

## 2025-08-07 RX ORDER — PRAVASTATIN SODIUM 40 MG/1
TABLET ORAL
Qty: 90 TABLET | Status: ACTIVE | COMMUNITY

## 2025-08-07 RX ORDER — HYDROCODONE BITARTRATE AND ACETAMINOPHEN 10; 325 MG/1; MG/1
TAKE 1 TABLET BY ORAL ROUTE EVERY 6 HOURS AS NEEDED FOR PAIN TABLET ORAL
Qty: 0 | Refills: 0 | Status: ACTIVE | COMMUNITY
Start: 1900-01-01